# Patient Record
Sex: MALE | Race: WHITE | Employment: FULL TIME | URBAN - METROPOLITAN AREA
[De-identification: names, ages, dates, MRNs, and addresses within clinical notes are randomized per-mention and may not be internally consistent; named-entity substitution may affect disease eponyms.]

---

## 2017-01-17 ENCOUNTER — HOSPITAL ENCOUNTER (INPATIENT)
Age: 47
LOS: 3 days | Discharge: HOME | DRG: 286 | End: 2017-01-20
Attending: INTERNAL MEDICINE | Admitting: INTERNAL MEDICINE
Payer: COMMERCIAL

## 2017-01-17 ENCOUNTER — APPOINTMENT (OUTPATIENT)
Dept: GENERAL RADIOLOGY | Age: 47
DRG: 286 | End: 2017-01-17
Attending: PHYSICIAN ASSISTANT
Payer: COMMERCIAL

## 2017-01-17 DIAGNOSIS — R06.01 ORTHOPNEA: ICD-10-CM

## 2017-01-17 DIAGNOSIS — R29.818 SUSPECTED SLEEP APNEA: ICD-10-CM

## 2017-01-17 DIAGNOSIS — J90 PLEURAL EFFUSION: ICD-10-CM

## 2017-01-17 DIAGNOSIS — I50.23 ACUTE ON CHRONIC SYSTOLIC (CONGESTIVE) HEART FAILURE (HCC): ICD-10-CM

## 2017-01-17 DIAGNOSIS — I16.0 HYPERTENSIVE URGENCY: ICD-10-CM

## 2017-01-17 PROBLEM — R06.00 DYSPNEA: Status: ACTIVE | Noted: 2017-01-17

## 2017-01-17 LAB
ALBUMIN SERPL BCP-MCNC: 3.6 G/DL (ref 3.5–5)
ALBUMIN/GLOB SERPL: 1 {RATIO} (ref 1.2–3.5)
ALP SERPL-CCNC: 60 U/L (ref 50–136)
ALT SERPL-CCNC: 51 U/L (ref 12–65)
ANION GAP BLD CALC-SCNC: 10 MMOL/L (ref 7–16)
AST SERPL W P-5'-P-CCNC: 16 U/L (ref 15–37)
BILIRUB SERPL-MCNC: 0.7 MG/DL (ref 0.2–1.1)
BNP SERPL-MCNC: 511 PG/ML
BUN SERPL-MCNC: 21 MG/DL (ref 6–23)
CALCIUM SERPL-MCNC: 9 MG/DL (ref 8.3–10.4)
CHLORIDE SERPL-SCNC: 104 MMOL/L (ref 98–107)
CO2 SERPL-SCNC: 28 MMOL/L (ref 21–32)
CREAT SERPL-MCNC: 1.3 MG/DL (ref 0.8–1.5)
ERYTHROCYTE [DISTWIDTH] IN BLOOD BY AUTOMATED COUNT: 13.2 % (ref 11.9–14.6)
GLOBULIN SER CALC-MCNC: 3.7 G/DL (ref 2.3–3.5)
GLUCOSE SERPL-MCNC: 170 MG/DL (ref 65–100)
HCT VFR BLD AUTO: 41.5 % (ref 41.1–50.3)
HGB BLD-MCNC: 14.2 G/DL (ref 13.6–17.2)
MAGNESIUM SERPL-MCNC: 2 MG/DL (ref 1.8–2.4)
MCH RBC QN AUTO: 31.9 PG (ref 26.1–32.9)
MCHC RBC AUTO-ENTMCNC: 34.2 G/DL (ref 31.4–35)
MCV RBC AUTO: 93.3 FL (ref 79.6–97.8)
PLATELET # BLD AUTO: 248 K/UL (ref 150–450)
PMV BLD AUTO: 11.6 FL (ref 10.8–14.1)
POTASSIUM SERPL-SCNC: 3.7 MMOL/L (ref 3.5–5.1)
PROT SERPL-MCNC: 7.3 G/DL (ref 6.3–8.2)
RBC # BLD AUTO: 4.45 M/UL (ref 4.23–5.67)
SODIUM SERPL-SCNC: 142 MMOL/L (ref 136–145)
TROPONIN I SERPL-MCNC: 0.11 NG/ML (ref 0.02–0.05)
WBC # BLD AUTO: 14.6 K/UL (ref 4.3–11.1)

## 2017-01-17 PROCEDURE — 83880 ASSAY OF NATRIURETIC PEPTIDE: CPT | Performed by: PHYSICIAN ASSISTANT

## 2017-01-17 PROCEDURE — 99223 1ST HOSP IP/OBS HIGH 75: CPT | Performed by: INTERNAL MEDICINE

## 2017-01-17 PROCEDURE — 71020 XR CHEST PA LAT: CPT

## 2017-01-17 PROCEDURE — 74011250637 HC RX REV CODE- 250/637: Performed by: PHYSICIAN ASSISTANT

## 2017-01-17 PROCEDURE — 36415 COLL VENOUS BLD VENIPUNCTURE: CPT | Performed by: PHYSICIAN ASSISTANT

## 2017-01-17 PROCEDURE — 83735 ASSAY OF MAGNESIUM: CPT | Performed by: PHYSICIAN ASSISTANT

## 2017-01-17 PROCEDURE — 84484 ASSAY OF TROPONIN QUANT: CPT | Performed by: PHYSICIAN ASSISTANT

## 2017-01-17 PROCEDURE — 85027 COMPLETE CBC AUTOMATED: CPT | Performed by: PHYSICIAN ASSISTANT

## 2017-01-17 PROCEDURE — 80053 COMPREHEN METABOLIC PANEL: CPT | Performed by: PHYSICIAN ASSISTANT

## 2017-01-17 PROCEDURE — 65660000000 HC RM CCU STEPDOWN

## 2017-01-17 PROCEDURE — 74011250636 HC RX REV CODE- 250/636: Performed by: PHYSICIAN ASSISTANT

## 2017-01-17 PROCEDURE — 74011000258 HC RX REV CODE- 258: Performed by: PHYSICIAN ASSISTANT

## 2017-01-17 RX ORDER — ALBUTEROL SULFATE 90 UG/1
2 AEROSOL, METERED RESPIRATORY (INHALATION)
Status: DISCONTINUED | OUTPATIENT
Start: 2017-01-17 | End: 2017-01-20 | Stop reason: HOSPADM

## 2017-01-17 RX ORDER — CODEINE PHOSPHATE AND GUAIFENESIN 10; 100 MG/5ML; MG/5ML
5 SOLUTION ORAL
Status: DISCONTINUED | OUTPATIENT
Start: 2017-01-17 | End: 2017-01-20 | Stop reason: HOSPADM

## 2017-01-17 RX ORDER — SODIUM CHLORIDE 0.9 % (FLUSH) 0.9 %
5-10 SYRINGE (ML) INJECTION AS NEEDED
Status: DISCONTINUED | OUTPATIENT
Start: 2017-01-17 | End: 2017-01-20 | Stop reason: HOSPADM

## 2017-01-17 RX ORDER — CARVEDILOL 6.25 MG/1
6.25 TABLET ORAL 2 TIMES DAILY WITH MEALS
Status: DISCONTINUED | OUTPATIENT
Start: 2017-01-17 | End: 2017-01-19

## 2017-01-17 RX ORDER — ONDANSETRON 2 MG/ML
4 INJECTION INTRAMUSCULAR; INTRAVENOUS
Status: DISCONTINUED | OUTPATIENT
Start: 2017-01-17 | End: 2017-01-20 | Stop reason: HOSPADM

## 2017-01-17 RX ORDER — AMOXICILLIN AND CLAVULANATE POTASSIUM 875; 125 MG/1; MG/1
1 TABLET, FILM COATED ORAL EVERY 12 HOURS
Status: DISCONTINUED | OUTPATIENT
Start: 2017-01-17 | End: 2017-01-20 | Stop reason: HOSPADM

## 2017-01-17 RX ORDER — HYDROCODONE BITARTRATE AND ACETAMINOPHEN 7.5; 325 MG/1; MG/1
1 TABLET ORAL
Status: DISCONTINUED | OUTPATIENT
Start: 2017-01-17 | End: 2017-01-20 | Stop reason: HOSPADM

## 2017-01-17 RX ORDER — ACETAMINOPHEN 325 MG/1
650 TABLET ORAL
Status: DISCONTINUED | OUTPATIENT
Start: 2017-01-17 | End: 2017-01-20 | Stop reason: HOSPADM

## 2017-01-17 RX ORDER — LISINOPRIL 5 MG/1
2.5 TABLET ORAL DAILY
Status: DISCONTINUED | OUTPATIENT
Start: 2017-01-18 | End: 2017-01-19

## 2017-01-17 RX ORDER — NITROGLYCERIN 0.4 MG/1
0.4 TABLET SUBLINGUAL
Status: DISCONTINUED | OUTPATIENT
Start: 2017-01-17 | End: 2017-01-20 | Stop reason: HOSPADM

## 2017-01-17 RX ADMIN — NITROGLYCERIN 1 INCH: 20 OINTMENT TOPICAL at 19:34

## 2017-01-17 RX ADMIN — NITROGLYCERIN 1 INCH: 20 OINTMENT TOPICAL at 23:27

## 2017-01-17 RX ADMIN — FUROSEMIDE 10 MG/HR: 10 INJECTION, SOLUTION INTRAVENOUS at 19:35

## 2017-01-17 RX ADMIN — AMOXICILLIN AND CLAVULANATE POTASSIUM 1 TABLET: 875; 125 TABLET, FILM COATED ORAL at 21:36

## 2017-01-17 RX ADMIN — CARVEDILOL 6.25 MG: 6.25 TABLET, FILM COATED ORAL at 19:33

## 2017-01-17 NOTE — IP AVS SNAPSHOT
Current Discharge Medication List  
  
Take these medications at their scheduled times Dose & Instructions Dispensing Information Comments Morning Noon Evening Bedtime  
 carvedilol 12.5 mg tablet Commonly known as:  Jeison Grulla Your next dose is: Today Dose:  12.5 mg Take 1 Tab by mouth two (2) times daily (with meals). Quantity:  60 Tab Refills:  6  
     
  
   
   
  
   
  
 furosemide 40 mg tablet Commonly known as:  LASIX Your next dose is:  Tomorrow Dose:  40 mg Take 1 Tab by mouth daily. Quantity:  30 Tab Refills:  6  
     
  
   
   
   
  
 lisinopril 10 mg tablet Commonly known as:  Ashkan Bridges Your next dose is:  Tomorrow Dose:  10 mg Take 1 Tab by mouth daily. Quantity:  30 Tab Refills:  6  
     
  
   
   
   
  
 spironolactone 25 mg tablet Commonly known as:  ALDACTONE Your next dose is:  Tomorrow Dose:  25 mg Take 1 Tab by mouth daily. Quantity:  30 Tab Refills:  6 Take these medications as needed Dose & Instructions Dispensing Information Comments Morning Noon Evening Bedtime VENTOLIN HFA 90 mcg/actuation inhaler Generic drug:  albuterol Notes to Patient:  AS NEEDED Dose:  1-2 Puff Take 1-2 Puffs by inhalation every six (6) hours as needed for Wheezing. Refills:  0 Where to Get Your Medications Information about where to get these medications is not yet available ! Ask your nurse or doctor about these medications  
  carvedilol 12.5 mg tablet  
 furosemide 40 mg tablet  
 lisinopril 10 mg tablet  
 spironolactone 25 mg tablet

## 2017-01-17 NOTE — IP AVS SNAPSHOT
Steve Bailey 
 
 
 [address] 
[phone] Patient: Rosalind Soriano MRN: [id number] RHQ:[id number] You are allergic to the following No active allergies Recent Documentation Weight BMI Smoking Status 74.6 kg 26.33 kg/m2 Never Smoker Emergency Contacts Name Discharge Info Relation Home Work Mobile Danielle Webb  Spouse [3] [phone] About your hospitalization You were admitted on:  January 17, 2017 You last received care in the:  [facility] 3 TELEMETRY You were discharged on:  January 20, 2017 Unit phone number:  [phone] Why you were hospitalized Your primary diagnosis was:  Acute On Chronic Systolic (Congestive) Heart Failure (Hcc) Your diagnoses also included:  Hypertensive Urgency, Suspected Sleep Apnea, Dyspnea, Pleural Effusion Providers Seen During Your Hospitalizations Provider Role Specialty Primary office phone Shiv Gomez MD Attending Provider Cardiology [phone] Your Primary Care Physician (PCP) Primary Care Physician Office Phone Office Fax Corey Britt [phone] Follow-up Information Follow up With Details Comments Contact Info Paulie Delgado MD On 1/25/2017 Wednesday, Jan 25th @ 1:45pm-- [place] office [address]  [address] Cardiology [facility] [address] 
[phone] Jeffrey Whitehead MD   [id number] [place] [phone] Blood Work  Have blood work drawn in 1 week prior to follow-up appointment. Your Appointments Wednesday January 25, 2017  1:45 PM EST TRANSITIONAL CARE MANAGEMENT with Paulie Delgado MD  
[address] Cardiology ([address] [place] [address]) [address] 
[address]  
[phone] Current Discharge Medication List  
  
START taking these medications Dose & Instructions Dispensing Information Comments Morning Noon Evening Bedtime  
 carvedilol 12.5 mg tablet Commonly known as:  Lonnie George Your next dose is: Today Dose:  12.5 mg Take 1 Tab by mouth two (2) times daily (with meals). Quantity:  60 Tab Refills:  6  
     
  
   
   
  
   
  
 furosemide 40 mg tablet Commonly known as:  LASIX Your next dose is:  Tomorrow Dose:  40 mg Take 1 Tab by mouth daily. Quantity:  30 Tab Refills:  6  
     
  
   
   
   
  
 lisinopril 10 mg tablet Commonly known as:  Robertha Roup Your next dose is:  Tomorrow Dose:  10 mg Take 1 Tab by mouth daily. Quantity:  30 Tab Refills:  6  
     
  
   
   
   
  
 spironolactone 25 mg tablet Commonly known as:  ALDACTONE Your next dose is:  Tomorrow Dose:  25 mg Take 1 Tab by mouth daily. Quantity:  30 Tab Refills:  6 CONTINUE these medications which have NOT CHANGED Dose & Instructions Dispensing Information Comments Morning Noon Evening Bedtime VENTOLIN HFA 90 mcg/actuation inhaler Generic drug:  albuterol Notes to Patient:  AS NEEDED Dose:  1-2 Puff Take 1-2 Puffs by inhalation every six (6) hours as needed for Wheezing. Refills:  0 STOP taking these medications AUGMENTIN 875-125 mg per tablet Generic drug:  amoxicillin-clavulanate  
   
  
 chlorthalidone 25 mg tablet Commonly known as:  HYGROTEN  
   
  
 guaiFENesin-codeine 100-10 mg/5 mL solution Commonly known as:  ROBITUSSIN AC  
   
  
 nebivolol 10 mg tablet Commonly known as:  BYSTOLIC Where to Get Your Medications Information on where to get these meds will be given to you by the nurse or doctor. ! Ask your nurse or doctor about these medications  
  carvedilol 12.5 mg tablet  
 furosemide 40 mg tablet  
 lisinopril 10 mg tablet  
 spironolactone 25 mg tablet Discharge Instructions Cardiac Catheterization/Angiography Discharge Instructions *Check the puncture site frequently for swelling or bleeding. If you see any bleeding, lie down and apply pressure over the area with a clean town or washcloth. Notify your doctor for any redness, swelling, drainage or oozing from the puncture site. Notify your doctor for any fever or chills. *If the leg or arm with the puncture becomes cold, numb or painful, call [facility] Cardiology at [phone] *Activity should be limited for the next 48 hours. Climb stairs as little as possible and avoid any stooping, bending or strenuous activity for 48 hours. No heavy lifting (anything over 10 pounds) for three days. *Do not drive for 48 hours. *You may resume your usual diet. Drink more fluids than usual. 
 
*Have a responsible person drive you home and stay with you for at least 24 hours after your heart catheterization/angiography. *You may remove the bandage from your Left Wrist in 24 hours. You may shower in 24 hours. No tub baths, hot tubs or swimming for one week. Do not place any lotions, creams, powders, ointments over the puncture site for one week. You may place a clean band-aid over the puncture site each day for 5 days. Change this daily. Heart Failure: Care Instructions Your Care Instructions Heart failure occurs when your heart does not pump as much blood as the body needs. Failure does not mean that the heart has stopped pumping but rather that it is not pumping as well as it should. Over time, this causes fluid buildup in your lungs and other parts of your body. Fluid buildup can cause shortness of breath, fatigue, swollen ankles, and other problems. By taking medicines regularly, reducing sodium (salt) in your diet, checking your weight every day, and making lifestyle changes, you can feel better and live longer. Follow-up care is a key part of your treatment and safety. Be sure to make and go to all appointments, and call your doctor if you are having problems. It's also a good idea to know your test results and keep a list of the medicines you take. How can you care for yourself at home? Medicines · Be safe with medicines. Take your medicines exactly as prescribed. Call your doctor if you think you are having a problem with your medicine. · Do not take any vitamins, over-the-counter medicine, or herbal products without talking to your doctor first. Ceres Pleva not take ibuprofen (Advil or Motrin) and naproxen (Aleve) without talking to your doctor first. They could make your heart failure worse. · You may be taking some of the following medicine. ¨ Beta-blockers can slow heart rate, decrease blood pressure, and improve your condition. Taking a beta-blocker may lower your chance of needing to be hospitalized. ¨ Angiotensin-converting enzyme inhibitors (ACEIs) reduce the heart's workload, lower blood pressure, and reduce swelling. Taking an ACEI may lower your chance of needing to be hospitalized again. ¨ Angiotensin II receptor blockers (ARBs) work like ACEIs. Your doctor may prescribe them instead of ACEIs. ¨ Diuretics, also called water pills, reduce swelling. ¨ Potassium supplements replace this important mineral, which is sometimes lost with diuretics. ¨ Aspirin and other blood thinners prevent blood clots, which can cause a stroke or heart attack. You will get more details on the specific medicines your doctor prescribes. Diet · Your doctor may suggest that you limit sodium to 2,000 milligrams (mg) a day or less. That is less than 1 teaspoon of salt a day, including all the salt you eat in cooking or in packaged foods. People get most of their sodium from processed foods. Fast food and restaurant meals also tend to be very high in sodium. · Ask your doctor how much liquid you can drink each day.  You may have to limit liquids. Weight · Weigh yourself without clothing at the same time each day. Record your weight. Call your doctor if you gain more than 3 pounds in 2 to 3 days. A sudden weight gain may mean that your heart failure is getting worse. Activity level · Start light exercise (if your doctor says it is okay). Even if you can only do a small amount, exercise will help you get stronger, have more energy, and manage your weight and your stress. Walking is an easy way to get exercise. Start out by walking a little more than you did before. Bit by bit, increase the amount you walk. · When you exercise, watch for signs that your heart is working too hard. You are pushing yourself too hard if you cannot talk while you are exercising. If you become short of breath or dizzy or have chest pain, stop, sit down, and rest. 
· If you feel \"wiped out\" the day after you exercise, walk slower or for a shorter distance until you can work up to a better pace. · Get enough rest at night. Sleeping with 1 or 2 pillows under your upper body and head may help you breathe easier. Lifestyle changes · Do not smoke. Smoking can make a heart condition worse. If you need help quitting, talk to your doctor about stop-smoking programs and medicines. These can increase your chances of quitting for good. Quitting smoking may be the most important step you can take to protect your heart. · Limit alcohol to 2 drinks a day for men and 1 drink a day for women. Too much alcohol can cause health problems. · Avoid getting sick from colds and the flu. Get a pneumococcal vaccine shot. If you have had one before, ask your doctor whether you need another dose. Get a flu shot each year. If you must be around people with colds or the flu, wash your hands often. When should you call for help? Call 911 if you have symptoms of sudden heart failure such as: 
· You have severe trouble breathing. · You cough up pink, foamy mucus. · You have a new irregular or rapid heartbeat. Call your doctor now or seek immediate medical care if: 
· You have new or increased shortness of breath. · You are dizzy or lightheaded, or you feel like you may faint. · You have sudden weight gain, such as 3 pounds or more in 2 to 3 days. · You have increased swelling in your legs, ankles, or feet. · You are suddenly so tired or weak that you cannot do your usual activities. Watch closely for changes in your health, and be sure to contact your doctor if: 
· You develop new symptoms. Where can you learn more? Go to http://edith-jose luis.info/. Enter J789 in the search box to learn more about \"Heart Failure: Care Instructions. \" Current as of: January 27, 2016 Content Version: 11.1 © 3704-3985 [employer]. Care instructions adapted under license by [employer] (which disclaims liability or warranty for this information). If you have questions about a medical condition or this instruction, always ask your healthcare professional. [place] [address] any warranty or liability for your use of this information. Limiting Sodium and Fluids With Heart Failure: Care Instructions Your Care Instructions Sodium causes your body to keep extra water, making it harder for your heart to pump. By limiting sodium, you will feel better and lower your risk of having to go to the hospital. 
People get most of their sodium from processed foods. Fast food and restaurant meals also tend to be very high in sodium. Your doctor may suggest that you limit sodium to 2,000 milligrams (mg) a day or less. That is less than 1 teaspoon of salt a day, including all the salt you eat in cooked or packaged foods. Usually, you have to limit the amount of liquids you drink only if your heart failure is severe. Limiting sodium alone often is enough to help your body get rid of extra fluids.  However, your doctor may tell you to limit your fluid intake to a set amount each day. Follow-up care is a key part of your treatment and safety. Be sure to make and go to all appointments, and call your doctor if you are having problems. It's also a good idea to know your test results and keep a list of the medicines you take. How can you care for yourself at home? Read food labels · Read food labels on cans and food packages. The labels tell you how much sodium is in each serving. Make sure that you look at the serving size. If you eat more than the serving size, you have eaten more sodium than is listed for one serving. · Food labels also tell you the Percent Daily Value. If the Percent Daily Value says 50%, it means that you will get at least 50% of all the sodium you need for the entire day in one serving. Choose products with low Percent Daily Values for sodium. · Be aware that sodium can come in forms other than salt, including monosodium glutamate (MSG), sodium citrate, and sodium bicarbonate (baking soda). MSG is often added to Asian food. You can sometimes ask for food without MSG or salt. Buy low-sodium foods · Buy foods that are labeled \"unsalted\" (no salt added), \"sodium-free\" (less than 5 mg of sodium per serving), or \"low-sodium\" (less than 140 mg of sodium per serving). A food labeled \"light sodium\" has less than half of the full-sodium version of that food. Foods labeled \"reduced-sodium\" may still have too much sodium. · Buy fresh vegetables or plain, frozen vegetables. Buy low-sodium versions of canned vegetables, soups, and other canned goods. Prepare low-sodium meals · Use less salt each day when cooking. Reducing salt in this way will help you adjust to the taste. Do not add salt after cooking. Take the salt shaker off the table. · Flavor your food with garlic, lemon juice, onion, vinegar, herbs, and spices instead of salt. Do not use soy sauce, steak sauce, onion salt, garlic salt, mustard, or ketchup on your food. · Make your own salad dressings, sauces, and ketchup without adding salt. · Use less salt (or none) when recipes call for it. You can often use half the salt a recipe calls for without losing flavor. Other dishes like rice, pasta, and grains do not need added salt. · Rinse canned vegetables. This removes somebut not allof the salt. · Avoid water that has a naturally high sodium content or that has been treated with water softeners, which add sodium. Call your local water company to find out the sodium content of your water supply. If you buy bottled water, read the label and choose a sodium-free brand. Avoid high-sodium foods, such as: 
· Smoked, cured, salted, and canned meat, fish, and poultry. · Ham, patel, hot dogs, and luncheon meats. · Regular, hard, and processed cheese and regular peanut butter. · Crackers with salted tops. · Frozen prepared meals. · Canned and dried soups, broths, and bouillon, unless labeled sodium-free or low-sodium. · Canned vegetables, unless labeled sodium-free or low-sodium. · Salted snack foods such as chips and pretzels. · Western Tiffanie fries, pizza, tacos, and other fast foods. · Pickles, olives, ketchup, and other condiments, especially soy sauce, unless labeled sodium-free or low-sodium. If you cannot cook for yourself · Have family members or friends help you, or have someone cook low-sodium meals. · Check with your local senior nutrition program to find out where meals are served and whether they offer a low-sodium option. You can often find these programs through your local health department or hospital. 
· Have meals delivered to your home. Most [facility] have a Meals on SharePlow. These programs provide one hot meal a day for older adults, delivered to their homes. Ask whether these meals are low-sodium. Let them know that you are on a low-sodium diet. Limiting fluid intake · Find a method that works for you.  You might simply write down how much you drink every time you do. Some people keep a container filled with the amount of fluid allowed for that day. If they drink from a source other than the container, then they pour out that amount. · Measure your regular drinking glasses to find out how much fluid each one holds. Once you know this, you will not have to measure every time. · Besides water, milk, juices, and other drinks, some foods have a lot of fluid. Count any foods that will melt (such as ice cream or gelatin dessert) or liquid foods (such as soup) as part of your fluid intake for the day. Where can you learn more? Go to http://edith-jose luis.info/. Enter A166 in the search box to learn more about \"Limiting Sodium and Fluids With Heart Failure: Care Instructions. \" Current as of: January 27, 2016 Content Version: 11.1 © 9228-1242 [employer]. Care instructions adapted under license by [employer] (which disclaims liability or warranty for this information). If you have questions about a medical condition or this instruction, always ask your healthcare professional. [place] [address] any warranty or liability for your use of this information. Avoiding Triggers With Heart Failure: Care Instructions Your Care Instructions Triggers are anything that make your heart failure flare up. A flare-up is also called \"sudden heart failure\" or \"acute heart failure. \" When you have a flare-up, fluid builds up in your lungs, and you have problems breathing. You might need to go to the hospital. By watching for changes in your condition and avoiding triggers, you can prevent heart failure flare-ups. Follow-up care is a key part of your treatment and safety. Be sure to make and go to all appointments, and call your doctor if you are having problems. It's also a good idea to know your test results and keep a list of the medicines you take. How can you care for yourself at home? Watch for changes in your weight and condition · Weigh yourself without clothing at the same time each day. Record your weight. Call your doctor if you gain 3 pounds or more in 2 to 3 days. A sudden weight gain may mean that your heart failure is getting worse. · Keep a daily record of your symptoms. Write down any changes in how you feel, such as new shortness of breath, cough, or problems eating. Also record if your ankles are more swollen than usual and if you have to urinate in the night more often. Note anything that you ate or did that could have triggered these changes. Limit sodium Sodium causes your body to hold on to water, making it harder for your heart to pump. People get most of their sodium from processed foods. Fast food and restaurant meals also tend to be very high in sodium. · Your doctor may suggest that you limit sodium to 2,000 milligrams (mg) a day or less. That is less than 1 teaspoon of salt a day, including all the salt you eat in cooking or in packaged foods. · Read food labels on cans and food packages. They tell you how much sodium you get in one serving. Check the serving size. If you eat more than one serving, you are getting more sodium. · Be aware that sodium can come in forms other than salt, including monosodium glutamate (MSG), sodium citrate, and sodium bicarbonate (baking soda). MSG is often added to Asian food. You can sometimes ask for food without MSG or salt. · Slowly reducing salt will help you adjust to the taste. Take the salt shaker off the table. · Flavor your food with garlic, lemon juice, onion, vinegar, herbs, and spices instead of salt. Do not use soy sauce, steak sauce, onion salt, garlic salt, mustard, or ketchup on your food, unless it is labeled \"low-sodium\" or \"low-salt. \" 
· Make your own salad dressings, sauces, and ketchup without adding salt.  
· Use fresh or frozen ingredients, instead of canned ones, whenever you can. Choose low-sodium canned goods. · Eat less processed food and food from restaurants, including fast food. Exercise as directed Moderate, regular exercise is very good for your heart. It improves your blood flow and helps control your weight. But too much exercise can stress your heart and cause a heart failure flare-up. · Check with your doctor before you start an exercise program. 
· Walking is an easy way to get exercise. Start out slowly. Gradually increase the length and pace of your walk. Swimming, riding a bike, and using a treadmill are also good forms of exercise. · When you exercise, watch for signs that your heart is working too hard. You are pushing yourself too hard if you cannot talk while you are exercising. If you become short of breath or dizzy or have chest pain, stop, sit down, and rest. 
· Do not exercise when you do not feel well. Take medicines correctly · Take your medicines exactly as prescribed. Call your doctor if you think you are having a problem with your medicine. · Make a list of all the medicines you take. Include those prescribed to you by other doctors and any over-the-counter medicines, vitamins, or supplements you take. Take this list with you when you go to any doctor. · Take your medicines at the same time every day. It may help you to post a list of all the medicines you take every day and what time of day you take them. · Make taking your medicine as simple as you can. Plan times to take your medicines when you are doing other things, such as eating a meal or getting ready for bed. This will make it easier to remember to take your medicines. · Get organized. Use helpful tools, such as daily or weekly pill containers. When should you call for help? Call 911 if you have symptoms of sudden heart failure such as: 
· You have severe trouble breathing. · You cough up pink, foamy mucus. · You have a new irregular or rapid heartbeat. Call your doctor now or seek immediate medical care if: 
· You have new or increased shortness of breath. · You are dizzy or lightheaded, or you feel like you may faint. · You have sudden weight gain, such as 3 pounds or more in 2 to 3 days. · You have increased swelling in your legs, ankles, or feet. · You are suddenly so tired or weak that you cannot do your usual activities. Watch closely for changes in your health, and be sure to contact your doctor if you develop new symptoms. Where can you learn more? Go to http://edith-jose luis.info/. Enter Z718 in the search box to learn more about \"Avoiding Triggers With Heart Failure: Care Instructions. \" Current as of: April 27, 2016 Content Version: 11.1 © 4273-2558 [employer]. Care instructions adapted under license by [employer] (which disclaims liability or warranty for this information). If you have questions about a medical condition or this instruction, always ask your healthcare professional. [place] [address] any warranty or liability for your use of this information. DISCHARGE SUMMARY from Nurse The following personal items are in your possession at time of discharge: 
 
Dental Appliances: Lowers, Partials, With patient (Plate) Visual Aid: Glasses Jewelry: Ring, With patient Clothing: Pants, Shirt, Undergarments, With patient PATIENT INSTRUCTIONS: 
 
 
F-face looks uneven A-arms unable to move or move unevenly S-speech slurred or non-existent T-time-call 911 as soon as signs and symptoms begin-DO NOT go Back to bed or wait to see if you get better-TIME IS BRAIN. Warning Signs of HEART ATTACK Call 911 if you have these symptoms: 
? Chest discomfort. Most heart attacks involve discomfort in the center of the chest that lasts more than a few minutes, or that goes away and comes back. It can feel like uncomfortable pressure, squeezing, fullness, or pain. ? Discomfort in other areas of the upper body. Symptoms can include pain or discomfort in one or both arms, the back, neck, jaw, or stomach. ? Shortness of breath with or without chest discomfort. ? Other signs may include breaking out in a cold sweat, nausea, or lightheadedness. Don't wait more than five minutes to call [address]! Fast action can save your life. Calling 911 is almost always the fastest way to get lifesaving treatment. Emergency Medical Services staff can begin treatment when they arrive  up to an hour sooner than if someone gets to the hospital by car. The discharge information has been reviewed with the patient. The patient verbalized understanding. Discharge medications reviewed with the patient and appropriate educational materials and side effects teaching were provided. Discharge Orders Procedure Order Date Status Priority Quantity Spec Type Associated Dx METABOLIC PANEL, BASIC 55/63/32 1410 Future Routine 1 Blood fuseSPORT Announcement We are excited to announce that we are making your provider's discharge notes available to you in fuseSPORT. You will see these notes when they are completed and signed by the physician that discharged you from your recent hospital stay. If you have any questions or concerns about any information you see in fuseSPORT, please call the Health Information Department where you were seen or reach out to your Primary Care Provider for more information about your plan of care. Introducing [facility]!    
 Dianne Cortes introduces [employer]SPORT patient portal. Now you can access parts of your medical record, email your doctor's office, and request medication refills online. 1. In your internet browser, go to [email or web address] 2. Click on the First Time User? Click Here link in the Sign In box. You will see the New Member Sign Up page. 3. Enter your Memobox Access Code exactly as it appears below. You will not need to use this code after youve completed the sign-up process. If you do not sign up before the expiration date, you must request a new code. · Memobox Access Code: [email or web address] Expires: 4/17/2017  2:24 PM 
 
4. Enter the last four digits of your Social Security Number (xxxx) and Date of Birth (mm/dd/yyyy) as indicated and click Submit. You will be taken to the next sign-up page. 5. Create a Memobox ID. This will be your Memobox login ID and cannot be changed, so think of one that is secure and easy to remember. 6. Create a Memobox password. You can change your password at any time. 7. Enter your Password Reset Question and Answer. This can be used at a later time if you forget your password. 8. Enter your e-mail address. You will receive e-mail notification when new information is available in [address] 9. Click Sign Up. You can now view and download portions of your medical record. 10. Click the Download Summary menu link to download a portable copy of your medical information. If you have questions, please visit the Frequently Asked Questions section of the Memobox website. Remember, Memobox is NOT to be used for urgent needs. For medical emergencies, dial 911. Now available from your iPhone and Android! General Information Please provide this summary of care documentation to your next provider. Patient Signature:  ____________________________________________________________ Date:  ____________________________________________________________  
  
Layla Feller  Provider Signature: ____________________________________________________________ Date:  ____________________________________________________________

## 2017-01-18 LAB
ANION GAP BLD CALC-SCNC: 10 MMOL/L (ref 7–16)
BUN SERPL-MCNC: 27 MG/DL (ref 6–23)
CALCIUM SERPL-MCNC: 9.1 MG/DL (ref 8.3–10.4)
CHLORIDE SERPL-SCNC: 102 MMOL/L (ref 98–107)
CHOLEST SERPL-MCNC: 180 MG/DL
CO2 SERPL-SCNC: 29 MMOL/L (ref 21–32)
CREAT SERPL-MCNC: 1.45 MG/DL (ref 0.8–1.5)
ERYTHROCYTE [DISTWIDTH] IN BLOOD BY AUTOMATED COUNT: 13.4 % (ref 11.9–14.6)
GLUCOSE SERPL-MCNC: 149 MG/DL (ref 65–100)
HCT VFR BLD AUTO: 41 % (ref 41.1–50.3)
HDLC SERPL-MCNC: 45 MG/DL (ref 40–60)
HDLC SERPL: 4 {RATIO}
HGB BLD-MCNC: 13.8 G/DL (ref 13.6–17.2)
LDLC SERPL CALC-MCNC: 121.4 MG/DL
LIPID PROFILE,FLP: ABNORMAL
MAGNESIUM SERPL-MCNC: 2.2 MG/DL (ref 1.8–2.4)
MCH RBC QN AUTO: 31.7 PG (ref 26.1–32.9)
MCHC RBC AUTO-ENTMCNC: 33.7 G/DL (ref 31.4–35)
MCV RBC AUTO: 94 FL (ref 79.6–97.8)
PLATELET # BLD AUTO: 255 K/UL (ref 150–450)
PMV BLD AUTO: 12 FL (ref 10.8–14.1)
POTASSIUM SERPL-SCNC: 3.7 MMOL/L (ref 3.5–5.1)
RBC # BLD AUTO: 4.36 M/UL (ref 4.23–5.67)
SODIUM SERPL-SCNC: 141 MMOL/L (ref 136–145)
TRIGL SERPL-MCNC: 68 MG/DL (ref 35–150)
TROPONIN I SERPL-MCNC: 0.1 NG/ML (ref 0.02–0.05)
TROPONIN I SERPL-MCNC: 0.11 NG/ML (ref 0.02–0.05)
TROPONIN I SERPL-MCNC: 0.11 NG/ML (ref 0.02–0.05)
TROPONIN I SERPL-MCNC: 0.13 NG/ML (ref 0.02–0.05)
VLDLC SERPL CALC-MCNC: 13.6 MG/DL (ref 6–23)
WBC # BLD AUTO: 13.4 K/UL (ref 4.3–11.1)

## 2017-01-18 PROCEDURE — 97530 THERAPEUTIC ACTIVITIES: CPT

## 2017-01-18 PROCEDURE — 84484 ASSAY OF TROPONIN QUANT: CPT | Performed by: PHYSICIAN ASSISTANT

## 2017-01-18 PROCEDURE — 74011250637 HC RX REV CODE- 250/637: Performed by: NURSE PRACTITIONER

## 2017-01-18 PROCEDURE — 99232 SBSQ HOSP IP/OBS MODERATE 35: CPT | Performed by: INTERNAL MEDICINE

## 2017-01-18 PROCEDURE — 97161 PT EVAL LOW COMPLEX 20 MIN: CPT

## 2017-01-18 PROCEDURE — 83735 ASSAY OF MAGNESIUM: CPT | Performed by: PHYSICIAN ASSISTANT

## 2017-01-18 PROCEDURE — 80061 LIPID PANEL: CPT | Performed by: PHYSICIAN ASSISTANT

## 2017-01-18 PROCEDURE — 36415 COLL VENOUS BLD VENIPUNCTURE: CPT | Performed by: PHYSICIAN ASSISTANT

## 2017-01-18 PROCEDURE — 80048 BASIC METABOLIC PNL TOTAL CA: CPT | Performed by: PHYSICIAN ASSISTANT

## 2017-01-18 PROCEDURE — 74011250637 HC RX REV CODE- 250/637: Performed by: PHYSICIAN ASSISTANT

## 2017-01-18 PROCEDURE — 65660000000 HC RM CCU STEPDOWN

## 2017-01-18 PROCEDURE — 74011250636 HC RX REV CODE- 250/636: Performed by: PHYSICIAN ASSISTANT

## 2017-01-18 PROCEDURE — 74011000258 HC RX REV CODE- 258: Performed by: PHYSICIAN ASSISTANT

## 2017-01-18 PROCEDURE — 85027 COMPLETE CBC AUTOMATED: CPT | Performed by: INTERNAL MEDICINE

## 2017-01-18 RX ORDER — ATORVASTATIN CALCIUM 40 MG/1
80 TABLET, FILM COATED ORAL
Status: DISCONTINUED | OUTPATIENT
Start: 2017-01-18 | End: 2017-01-20 | Stop reason: HOSPADM

## 2017-01-18 RX ORDER — GUAIFENESIN 100 MG/5ML
81 LIQUID (ML) ORAL DAILY
Status: DISCONTINUED | OUTPATIENT
Start: 2017-01-18 | End: 2017-01-20 | Stop reason: HOSPADM

## 2017-01-18 RX ORDER — SODIUM CHLORIDE 9 MG/ML
75 INJECTION, SOLUTION INTRAVENOUS CONTINUOUS
Status: DISCONTINUED | OUTPATIENT
Start: 2017-01-19 | End: 2017-01-19

## 2017-01-18 RX ORDER — CODEINE PHOSPHATE AND GUAIFENESIN 10; 100 MG/5ML; MG/5ML
5 SOLUTION ORAL
COMMUNITY
End: 2017-01-20

## 2017-01-18 RX ORDER — ALBUTEROL SULFATE 90 UG/1
1-2 AEROSOL, METERED RESPIRATORY (INHALATION)
COMMUNITY
End: 2017-02-27

## 2017-01-18 RX ORDER — AMOXICILLIN AND CLAVULANATE POTASSIUM 875; 125 MG/1; MG/1
1 TABLET, FILM COATED ORAL 2 TIMES DAILY
COMMUNITY
End: 2017-01-20

## 2017-01-18 RX ADMIN — LISINOPRIL: 5 TABLET ORAL at 10:18

## 2017-01-18 RX ADMIN — ASPIRIN 81 MG CHEWABLE TABLET 81 MG: 81 TABLET CHEWABLE at 10:18

## 2017-01-18 RX ADMIN — CARVEDILOL 6.25 MG: 6.25 TABLET, FILM COATED ORAL at 10:19

## 2017-01-18 RX ADMIN — FUROSEMIDE 10 MG/HR: 10 INJECTION, SOLUTION INTRAVENOUS at 07:10

## 2017-01-18 RX ADMIN — NITROGLYCERIN 1 INCH: 20 OINTMENT TOPICAL at 06:20

## 2017-01-18 RX ADMIN — NITROGLYCERIN 1 INCH: 20 OINTMENT TOPICAL at 18:00

## 2017-01-18 RX ADMIN — AMOXICILLIN AND CLAVULANATE POTASSIUM 1 TABLET: 875; 125 TABLET, FILM COATED ORAL at 22:37

## 2017-01-18 RX ADMIN — AMOXICILLIN AND CLAVULANATE POTASSIUM 1 TABLET: 875; 125 TABLET, FILM COATED ORAL at 10:22

## 2017-01-18 RX ADMIN — CARVEDILOL 6.25 MG: 6.25 TABLET, FILM COATED ORAL at 17:58

## 2017-01-18 RX ADMIN — ATORVASTATIN CALCIUM 80 MG: 40 TABLET, FILM COATED ORAL at 22:37

## 2017-01-18 NOTE — CONSULTS
CONSULT NOTE    Gordon Mayers    1/17/2017    Date of Admission:  1/17/2017    The patient's chart is reviewed and the patient is discussed with the staff. Subjective: The patient is a 40 y.o.  male seen and evaluated at the request of Dr. Chip Groves due to pleural effusions. The pt has a hx of HTN (non compliant with Rx) and probable MISSY (with snoring and witnessed apneas per wife) who is admitted for management of acute systolic heart failure and uncontrolled HTN. He has noted increased dyspnea over the past 2-3 weeks with increased orthopnea over the past week. He was seen in an urgent care center several days ago and was diagnosed with bronchitis and treated with oral antibiotics. He did not improve and presented to the of his PCP, Dr. Mark Atkinson who obtained an EKG. He was referred to cardiology for an echo which revealed an LVEF of 35% today with a pleural effusion for which he is now admitted. The pt reports a cough with scant blood tinged sputum a couple of days ago but no F/C/S. His wife has noticed dyspnea is worse while supine and some trace LE edema. He has not had any chest pain. He was diagnosed with HTN years ago but has not been compliant with treatment. He drinks considerable Coke daily and eats a lot of salty food. He is not a smoker. He drinks considerable sweet tea but does not think he has been gaining weight or having EDS. His BP was noted to be 212/116 at Dr. López Rod office. He is admitted for further management.        Review of Systems    Denies: fevers, chills, sweats, fatigue, malaise, anorexia, weight loss   Denies: blurry vision, loss of vision, eye pain, photophobia  Denies: hearing loss, ringing in the ears, earache, epistaxis  Denies: chest pain, palpitations, syncope, orthopnea, paroxysmal nocturnal dyspnea, claudication  Denies: dysphagia, odynophagia, nausea, vomiting, diarrhea, constipation, abdominal pain, jaundice, melena   Denies: frequency, dysuria, nocturia, urinary incontinence, stones, hematuria  Denies: polydipsia/polyuria, skin changes, temperature intolerance, unexpected weight gain  Denies: back pain, joint pain, joint swelling, muscle pain, muscle weakness  Denies: bleeding problems, blood transfusions, bruising, pallor, swollen lymph nodes  Denies: headache, dysarthria, blurred vision, diplopia,seizure, focal deficits. Admits to: dyspnea, orthopnea, snoring, witnessed apneas. Patient Active Problem List   Diagnosis Code    Acute on chronic systolic (congestive) heart failure (HCC) I50.23    Hypertensive urgency I16.0    Suspected sleep apnea G47.30    Dyspnea R06.00    Pleural effusion J90         Prior to Admission Medications   Prescriptions Last Dose Informant Patient Reported? Taking? GUAIFENESIN AC  mg/5 mL solution   Yes No   Sig: take 1 teaspoonful by mouth three times a day if needed for 5 days   VENTOLIN HFA 90 mcg/actuation inhaler   Yes No   Sig: inhale 1 to 2 puffs by mouth every 6 hours if needed for cough   amoxicillin-clavulanate (AUGMENTIN) 875-125 mg per tablet   Yes No   Sig: take 1 tablet by mouth twice a day for 10 days   chlorthalidone (HYGROTEN) 25 mg tablet   No No   Sig: Take 1 Tab by mouth daily for 30 days. nebivolol (BYSTOLIC) 10 mg tablet   No No   Sig: Take 1 Tab by mouth daily for 21 days. Facility-Administered Medications: None       No past medical history on file. Past Surgical History   Procedure Laterality Date    Hx cholecystectomy       Social History     Social History    Marital status:      Spouse name: N/A    Number of children: N/A    Years of education: N/A     Occupational History    Not on file.      Social History Main Topics    Smoking status: Never Smoker    Smokeless tobacco: Not on file    Alcohol use Not on file    Drug use: Not on file    Sexual activity: Not on file     Other Topics Concern    Not on file     Social History Narrative  No narrative on file     Family History   Problem Relation Age of Onset    Heart Attack Father     Heart Disease Father     Hypertension Father     Diabetes Sister     Hypertension Paternal Grandmother     Hypertension Paternal Grandfather     Cancer Neg Hx      No Known Allergies    Current Facility-Administered Medications   Medication Dose Route Frequency    amoxicillin-clavulanate (AUGMENTIN) 875-125 mg per tablet 1 Tab  1 Tab Oral Q12H    guaiFENesin-codeine (ROBITUSSIN AC) 100-10 mg/5 mL solution 5 mL  5 mL Oral Q4H PRN    albuterol (PROVENTIL HFA, VENTOLIN HFA, PROAIR HFA) inhaler 2 Puff  2 Puff Inhalation Q4H PRN    sodium chloride (NS) flush 5-10 mL  5-10 mL IntraVENous PRN    nitroglycerin (NITROSTAT) tablet 0.4 mg  0.4 mg SubLINGual Q5MIN PRN    acetaminophen (TYLENOL) tablet 650 mg  650 mg Oral Q4H PRN    HYDROcodone-acetaminophen (NORCO) 7.5-325 mg per tablet 1 Tab  1 Tab Oral Q4H PRN    furosemide (LASIX) 100 mg in 0.9% sodium chloride 100 mL infusion  10 mg/hr IntraVENous CONTINUOUS    carvedilol (COREG) tablet 6.25 mg  6.25 mg Oral BID WITH MEALS    [START ON 1/18/2017] lisinopril (PRINIVIL, ZESTRIL) tablet 2.5 mg  2.5 mg Oral DAILY    ondansetron (ZOFRAN) injection 4 mg  4 mg IntraVENous Q4H PRN    nitroglycerin (NITROBID) 2 % ointment 1 Inch  1 Inch Topical Q6H         Objective:     Vitals:    01/17/17 1825   BP: (!) 184/115   Pulse: 95   Temp: 97.4 °F (36.3 °C)   SpO2: 92%       PHYSICAL EXAM     Constitutional:  the patient is well developed and in no mild distress  EENMT:  Sclera clear, pupils equal, oral mucosa moist; the OP is narrow  Neck: no definite JVD at 90* or retractions  Respiratory: decreased BS at the bases with dullness  Cardiovascular:  RRR without M,G,R  Gastrointestinal: soft and non-tender; with positive bowel sounds. Musculoskeletal: warm without cyanosis. There is trace pedal edema.   Skin:  no jaundice or rashes  Neurologic: no gross neuro deficits Psychiatric:  alert and oriented x 3    Chest X-ray:                No results for input(s): WBC, HGB, HCT, PLT, INR, HGBEXT, HCTEXT, PLTEXT, HGBEXT, HCTEXT, PLTEXT in the last 72 hours. No lab exists for component: INREXT, INREXT  No results for input(s): NA, K, CL, GLU, CO2, BUN, CREA, MG, PHOS, CA, TROIQ, ALB, TBIL, TBILI, GPT, ALT, SGOT, BNPP in the last 72 hours. No lab exists for component: TROIP  No results for input(s): PH, PCO2, PO2, HCO3 in the last 72 hours. No results for input(s): LCAD, LAC in the last 72 hours. Assessment:  (Medical Decision Making)     Hospital Problems  Date Reviewed: 1/17/2017          Codes Class Noted POA    * (Principal)Acute on chronic systolic (congestive) heart failure ([facility] [address] ICD-10-CM: I50.23  ICD-9-CM: 428.23, 428.0  1/17/2017 Yes    Probably related to uncontrolled HTN. No history to suggest coronary ischemia. Hypertensive urgency ICD-10-CM: I16.0  ICD-9-CM: 401.9  1/17/2017 Yes    Needs aggressive BP control and diuresis    Suspected sleep apnea ICD-10-CM: G47.30  ICD-9-CM: 780.57  1/17/2017 Yes    Needs sleep eval after hospitalization. Consider empiric Rx with APAP but suspect he won't sleep much tonight on lasix drip. Dyspnea ICD-10-CM: R06.00  ICD-9-CM: 786.09  1/17/2017 Yes    Due to heart failure. Pleural effusion ICD-10-CM: J90  ICD-9-CM: 511.9  1/17/2017 Yes    Not really evident on CXR but noted on echo. Plan:  (Medical Decision Making)     BP control. Diuresis. Follow admission labs and follow up labs. Consider empiric APAP after diuresis tonight. PSG as outpt. Check TSH. Dietary modification needed. --    More than 50% of the time documented was spent in face-to-face contact with the patient and in the care of the patient on the floor/unit where the patient is located. Thank you very much for this referral.  We appreciate the opportunity to participate in this patient's care.   Will follow along with above stated Dewayne Jolly MD

## 2017-01-18 NOTE — PROGRESS NOTES
Bedside shift change report given to [address] [place] [address]. Report included the following information SBAR, Kardex, MAR and Recent Results.

## 2017-01-18 NOTE — PROGRESS NOTES
[facility] CARDIOLOGY PROGRESS NOTE           1/18/2017 8:05 AM    Admit Date: 1/17/2017      Subjective:   Breathing improved. HTN improved overnight. Able to lye flat overnight. Noted troponin levels, denies cp, nonsmoker, + family hx --brothers and father with CAD. ROS:  Cardiovascular:  As noted above    Objective:      Vitals:    01/17/17 2118 01/18/17 0037 01/18/17 0601 01/18/17 0802   BP: (!) 154/101 137/72 127/85 (!) 129/92   Pulse: 79 66 64 66   Resp: 18 18 18 16   Temp: 98.1 °F (36.7 °C) 98 °F (36.7 °C) 97.6 °F (36.4 °C) 97.7 °F (36.5 °C)   SpO2: 94% 93% 90% 94%   Weight:   76.8 kg (169 lb 6.4 oz)        Physical Exam:  General-No Acute Distress  Neck- supple, no JVD  CV- regular rate and rhythm no MRG  Lung- clear bilaterally  Abd- soft, nontender, nondistended  Ext- no edema bilaterally. Skin- warm and dry    Data Review:   Recent Labs      01/18/17   0515  01/18/17   0512  01/18/17   0041  01/17/17 2010   NA   --   141   --   142   K   --   3.7   --   3.7   MG   --   2.2   --   2.0   BUN   --   27*   --   21   CREA   --   1.45   --   1.30   GLU   --   149*   --   170*   WBC   --    --    --   14.6*   HGB   --    --    --   14.2   HCT   --    --    --   41.5   PLT   --    --    --   248   TROIQ  0.11*   --   0.11*  0.11*   CHOL   --   180   --    --    TGL   --   68   --    --    LDLC   --   121.4*   --    --    HDL   --   45   --    --        Assessment/Plan:     Principal Problem:    Acute on chronic systolic (congestive) heart failure (HCC) (1/17/2017)--reported EF 35 % on echo in office. Noted troponin levels, + fam hx, htn, dyslip--needs cath eventually. Will add low dose asa and statin. Will stop lasix gtt with rise in crt.      Active Problems:    Hypertensive urgency (1/17/2017)--improved, tolerating coreg and lisinopril      Suspected sleep apnea (1/17/2017)      Dyspnea (1/17/2017)      Pleural effusion (1/17/2017)--pulmonary following          Costella Brian, NP  1/18/2017 8:05 AM

## 2017-01-18 NOTE — PROGRESS NOTES
Bedside and Verbal shift change report given to Mark French RN (oncoming nurse) by Davide Frederick RN (offgoing nurse). Report included the following information SBAR, Kardex, Accordion and Recent Results.

## 2017-01-18 NOTE — PROGRESS NOTES
Roger Gannon  Admission Date: 1/17/2017             Daily Progress Note: 1/18/2017    The patient's chart is reviewed and the patient is discussed with the staff.     Subjective:     Feels better  On RA  For heat cath in AM    Current Facility-Administered Medications   Medication Dose Route Frequency    aspirin chewable tablet 81 mg  81 mg Oral DAILY    atorvastatin (LIPITOR) tablet 80 mg  80 mg Oral QHS    [START ON 1/19/2017] 0.9% sodium chloride infusion  75 mL/hr IntraVENous CONTINUOUS    amoxicillin-clavulanate (AUGMENTIN) 875-125 mg per tablet 1 Tab  1 Tab Oral Q12H    guaiFENesin-codeine (ROBITUSSIN AC) 100-10 mg/5 mL solution 5 mL  5 mL Oral Q4H PRN    albuterol (PROVENTIL HFA, VENTOLIN HFA, PROAIR HFA) inhaler 2 Puff  2 Puff Inhalation Q4H PRN    sodium chloride (NS) flush 5-10 mL  5-10 mL IntraVENous PRN    nitroglycerin (NITROSTAT) tablet 0.4 mg  0.4 mg SubLINGual Q5MIN PRN    acetaminophen (TYLENOL) tablet 650 mg  650 mg Oral Q4H PRN    HYDROcodone-acetaminophen (NORCO) 7.5-325 mg per tablet 1 Tab  1 Tab Oral Q4H PRN    carvedilol (COREG) tablet 6.25 mg  6.25 mg Oral BID WITH MEALS    lisinopril (PRINIVIL, ZESTRIL) tablet 2.5 mg  2.5 mg Oral DAILY    ondansetron (ZOFRAN) injection 4 mg  4 mg IntraVENous Q4H PRN    nitroglycerin (NITROBID) 2 % ointment 1 Inch  1 Inch Topical Q6H       Review of Systems    Constitutional: negative for fever, chills, sweats  Cardiovascular: negative for chest pain, palpitations, syncope, edema  Gastrointestinal:  negative for dysphagia, reflux, vomiting, diarrhea, abdominal pain, or melena  Neurologic:  negative for focal weakness, numbness, headache    Objective:     Vitals:    01/17/17 2118 01/18/17 0037 01/18/17 0601 01/18/17 0802   BP: (!) 154/101 137/72 127/85 (!) 129/92   Pulse: 79 66 64 66   Resp: 18 18 18 16   Temp: 98.1 °F (36.7 °C) 98 °F (36.7 °C) 97.6 °F (36.4 °C) 97.7 °F (36.5 °C)   SpO2: 94% 93% 90% 94%   Weight:   169 lb 6.4 oz (76.8 kg)      Intake and Output:   01/16 1901 - 01/18 0700  In: -   Out: 2550 [Urine:2550]  01/18 0701 - 01/18 1900  In: 340 [P.O.:340]  Out: -     Physical Exam:   Constitution:  the patient is well developed and in no acute distress  EENMT:  Sclera clear, pupils equal, oral mucosa moist  Respiratory: clear  Cardiovascular:  RRR without M,G,R  Gastrointestinal: soft and non-tender; with positive bowel sounds. Musculoskeletal: warm without cyanosis. There is no lower leg edema. Skin:  no jaundice or rashes, no wounds   Neurologic: no gross neuro deficits     Psychiatric:  alert and oriented x 3    CHEST XRAY:       LAB     Recent Labs      01/18/17   0500  01/17/17 2010   WBC  13.4*  14.6*   HGB  13.8  14.2   HCT  41.0*  41.5   PLT  255  248     Recent Labs      01/18/17   0515  01/18/17   0512  01/18/17   0041  01/17/17 2010   NA   --   141   --   142   K   --   3.7   --   3.7   CL   --   102   --   104   CO2   --   29   --   28   GLU   --   149*   --   170*   BUN   --   27*   --   21   CREA   --   1.45   --   1.30   MG   --   2.2   --   2.0   CA   --   9.1   --   9.0   TROIQ  0.11*   --   0.11*  0.11*   ALB   --    --    --   3.6   TBILI   --    --    --   0.7   ALT   --    --    --   51   SGOT   --    --    --   16   BNPP   --    --    --   511     No results for input(s): PH, PCO2, PO2, HCO3 in the last 72 hours. No results for input(s): LCAD, LAC in the last 72 hours.       Assessment:  (Medical Decision Making)     Hospital Problems  Date Reviewed: 1/17/2017          Codes Class Noted POA    * (Principal)Acute on chronic systolic (congestive) heart failure (HCC) ICD-10-CM: I50.23  ICD-9-CM: 428.23, 428.0  1/17/2017 Yes        Hypertensive urgency ICD-10-CM: I16.0  ICD-9-CM: 401.9  1/17/2017 Yes        Suspected sleep apnea has symptoms as snoring,DHS, witnessed apneas ICD-10-CM: G47.30  ICD-9-CM: 780.57  1/17/2017 Yes        Dyspnea ICD-10-CM: R06.00  ICD-9-CM: 786.09  1/17/2017 Yes        Pleural effusion ICD-10-CM: J90  ICD-9-CM: 511.9  1/17/2017 Yes              Plan:  (Medical Decision Making)     --heart cath in AM  - will arrange PSG as outpatient    More than 50% of the time documented was spent in face-to-face contact with the patient and in the care of the patient on the floor/unit where the patient is located.     Renan Plascencia MD

## 2017-01-18 NOTE — PROGRESS NOTES
SW completed chart review. Pt working full time, has private insurance, discharged from PT after having met all goals, functioning independently. No apparent D/C needs. SW will follow and remain available as needed.

## 2017-01-18 NOTE — PROGRESS NOTES
Problem: Mobility Impaired (Adult and Pediatric)  Goal: *Acute Goals and Plan of Care (Insert Text)  DISCHARGE GOALS: ALL GOALS MET  (1.)Mr. Ivan Quesada will move from supine to sit and sit to supine , scoot up and down and roll side to side with INDEPENDENT within 1 day(s). GOAL MET 1/18/2017   (2.)Mr. Ivan Quesada will transfer from bed to chair and chair to bed with INDEPENDENT using the least restrictive device within 1 day(s). GOAL MET 1/18/2017   (3.)Mr. Ivan Quesada will ambulate with INDEPENDENT for 500 feet with the least restrictive device within 1 day(s). GOAL MET 1/18/2017       PHYSICAL THERAPY: INITIAL ASSESSMENT, DISCHARGE, TREATMENT DAY: DAY OF ASSESSMENT, AM 1/18/2017  INPATIENT: Hospital Day: 2  Payor: [address] [place] [address] / Plan: [address] / Product Type: PPO /      NAME/AGE/GENDER: Caitlyn Toth is a 40 y.o. male   PRIMARY DIAGNOSIS: new onset chf  Acute on chronic systolic (congestive) heart failure (HCC) Acute on chronic systolic (congestive) heart failure (HCC) Acute on chronic systolic (congestive) heart failure (HCC)        ICD-10: Treatment Diagnosis:       · Generalized Muscle Weakness (M62.81)  · Difficulty in walking, Not elsewhere classified (R26.2)   Precaution/Allergies:  Review of patient's allergies indicates no known allergies. ASSESSMENT:      Mr. Ivan Quesada presents up in chair with wife present in room, agreeable to therapy. At baseline, he works full time as a . He ambulates independently and has no difficulty with ADLs at baseline. He stood and ambulated 500 feet independently without difficulty. No SOB displayed. MMT 5/5 and functional ROM exhibited. He has met all acute PT goals and resultantly will be DCd at this time. Thank you for this referral.                            RECOMMENDED REHABILITATION/EQUIPMENT: (at time of discharge pending progress): None.                    HISTORY:   History of Present Injury/Illness (Reason for Referral):  CHF  Past Medical History/Comorbidities:   Mr. Vandana Pelletier  has no past medical history on file. Mr. Vandana Pelletier  has a past surgical history that includes cholecystectomy. Social History/Living Environment:      Prior Level of Function/Work/Activity:  Independent, ambulates independently   Number of Personal Factors/Comorbidities that affect the Plan of Care: 0: LOW COMPLEXITY   EXAMINATION:   Most Recent Physical Functioning:   Gross Assessment:  AROM: Within functional limits  PROM: Within functional limits  Strength: Within functional limits  Coordination: Within functional limits  Tone: Normal  Sensation: Intact               Posture:  Posture (WDL): Exceptions to WDL  Posture Assessment: Forward head  Balance:  Sitting: Intact  Standing: Intact Bed Mobility:  Rolling: Independent  Supine to Sit: Independent  Scooting: Independent  Wheelchair Mobility:     Transfers:  Sit to Stand: Independent  Stand to Sit: Independent  Bed to Chair: Independent  Gait:     Distance (ft): 500 Feet (ft)  Ambulation - Level of Assistance: Independent       Body Structures Involved:  1. Bones  2. Joints  3. Muscles  4. Ligaments Body Functions Affected:  1. Neuromusculoskeletal  2. Movement Related Activities and Participation Affected:  1. Mobility  2. Self Care   Number of elements that affect the Plan of Care: 1-2: LOW COMPLEXITY   CLINICAL PRESENTATION:   Presentation: Stable and uncomplicated: LOW COMPLEXITY   CLINICAL DECISION [facility] [address] [address] AM-PAC 6 Clicks   Basic Mobility Inpatient Short Form  How much difficulty does the patient currently have. .. Unable A Lot A Little None   1. Turning over in bed (including adjusting bedclothes, sheets and blankets)? [ ] 1   [ ] 2   [ ] 3   [X] 4   2. Sitting down on and standing up from a chair with arms ( e.g., wheelchair, bedside commode, etc.)   [ ] 1   [ ] 2   [ ] 3   [X] 4   3. Moving from lying on back to sitting on the side of the bed?    [ ] 1   [ ] 2   [ ] 3   [X] 4   How much help from another person does the patient currently need. .. Total A Lot A Little None   4. Moving to and from a bed to a chair (including a wheelchair)? [ ] 1   [ ] 2   [ ] 3   [X] 4   5. Need to walk in hospital room? [ ] 1   [ ] 2   [ ] 3   [X] 4   6. Climbing 3-5 steps with a railing? [ ] 1   [ ] 2   [ ] 3   [X] 4   © 2007, Trustees of [address] [place] [address] [address] [address], under license to [employer]. All rights reserved    Score:  Initial: 24 Most Recent: X (Date: -- )     Interpretation of Tool:  Represents activities that are increasingly more difficult (i.e. Bed mobility, Transfers, Gait). Score 24 23 22-20 19-15 14-10 9-7 6       Modifier CH CI CJ CK CL CM CN         · Mobility - Walking and Moving Around:               - CURRENT STATUS:    CH - 0% impaired, limited or restricted               - GOAL STATUS:           CH - 0% impaired, limited or restricted               - D/C STATUS:                       ---------------To be determined---------------  Payor: BL[facility] CROSS [facility] / Plan: [place] / Product Type: PPO /       Medical Necessity:     · Eval, DC  ·    Use of outcome tool(s) and clinical judgement create a POC that gives a: Clear prediction of patient's progress: LOW COMPLEXITY                 TREATMENT:   (In addition to Assessment/Re-Assessment sessions the following treatments were rendered)   Pre-treatment Symptoms/Complaints:  None  Pain: Initial:   Pain Intensity 1: 0  Post Session:  0/10      Therapeutic Activity: (    8 MINUTE):  Therapeutic activities including Bed transfers, Chair transfers and Ambulation on level ground to improve mobility, strength, balance and coordination. Required no   to promote static and dynamic balance in standing.             Treatment/Session Assessment:    · Response to Treatment:  Verbalized understanding of PT and DC  · Interdisciplinary Collaboration:  · Physical Therapist  · Registered Nurse  · After treatment position/precautions:  · Up in chair  · Bed/Chair-wheels locked  · Bed in low position  · Call light within reach  · RN notified  · Family at bedside  · Compliance with Program/Exercises: Will assess as treatment progresses. · Recommendations/Intent for next treatment session: \"Next visit will focus on advancements to more challenging activities and reduction in assistance provided\".   Total Treatment Duration:  PT Patient Time In/Time Out  Time In: 0908  Time Out: [address] Manas William DPT

## 2017-01-18 NOTE — ROUTINE PROCESS
CHF teaching started post introduction to pt/family; aware of diagnosis. Planner/needs scale @ BS and will follow. Smoking/ ETOH/Illicit drug use cessation covered.  Pt/family aware that I can not prescribe nor adjust  medications: 15mins  Palliative Care score: <20  Start 2 L/D FR

## 2017-01-19 LAB
ANION GAP BLD CALC-SCNC: 8 MMOL/L (ref 7–16)
BUN SERPL-MCNC: 34 MG/DL (ref 6–23)
CALCIUM SERPL-MCNC: 8.9 MG/DL (ref 8.3–10.4)
CHLORIDE SERPL-SCNC: 102 MMOL/L (ref 98–107)
CO2 SERPL-SCNC: 31 MMOL/L (ref 21–32)
CREAT SERPL-MCNC: 1.49 MG/DL (ref 0.8–1.5)
GLUCOSE SERPL-MCNC: 139 MG/DL (ref 65–100)
MAGNESIUM SERPL-MCNC: 2.4 MG/DL (ref 1.8–2.4)
POTASSIUM SERPL-SCNC: 3.7 MMOL/L (ref 3.5–5.1)
SODIUM SERPL-SCNC: 141 MMOL/L (ref 136–145)
TROPONIN I SERPL-MCNC: 0.07 NG/ML (ref 0.02–0.05)
TROPONIN I SERPL-MCNC: 0.09 NG/ML (ref 0.02–0.05)

## 2017-01-19 PROCEDURE — 36415 COLL VENOUS BLD VENIPUNCTURE: CPT | Performed by: PHYSICIAN ASSISTANT

## 2017-01-19 PROCEDURE — 80048 BASIC METABOLIC PNL TOTAL CA: CPT | Performed by: PHYSICIAN ASSISTANT

## 2017-01-19 PROCEDURE — 74011250637 HC RX REV CODE- 250/637: Performed by: NURSE PRACTITIONER

## 2017-01-19 PROCEDURE — 77030004558 HC CATH ANGI DX SUPR TORQ CARD -A

## 2017-01-19 PROCEDURE — 74011250636 HC RX REV CODE- 250/636: Performed by: NURSE PRACTITIONER

## 2017-01-19 PROCEDURE — 93458 L HRT ARTERY/VENTRICLE ANGIO: CPT

## 2017-01-19 PROCEDURE — 74011250637 HC RX REV CODE- 250/637: Performed by: PHYSICIAN ASSISTANT

## 2017-01-19 PROCEDURE — 77030015766

## 2017-01-19 PROCEDURE — 74011000250 HC RX REV CODE- 250: Performed by: INTERNAL MEDICINE

## 2017-01-19 PROCEDURE — 84484 ASSAY OF TROPONIN QUANT: CPT | Performed by: PHYSICIAN ASSISTANT

## 2017-01-19 PROCEDURE — 99152 MOD SED SAME PHYS/QHP 5/>YRS: CPT

## 2017-01-19 PROCEDURE — C1769 GUIDE WIRE: HCPCS

## 2017-01-19 PROCEDURE — 65660000000 HC RM CCU STEPDOWN

## 2017-01-19 PROCEDURE — B2111ZZ FLUOROSCOPY OF MULTIPLE CORONARY ARTERIES USING LOW OSMOLAR CONTRAST: ICD-10-PCS | Performed by: INTERNAL MEDICINE

## 2017-01-19 PROCEDURE — 74011250636 HC RX REV CODE- 250/636: Performed by: INTERNAL MEDICINE

## 2017-01-19 PROCEDURE — 74011250637 HC RX REV CODE- 250/637: Performed by: INTERNAL MEDICINE

## 2017-01-19 PROCEDURE — 83735 ASSAY OF MAGNESIUM: CPT | Performed by: PHYSICIAN ASSISTANT

## 2017-01-19 PROCEDURE — 74011250636 HC RX REV CODE- 250/636

## 2017-01-19 PROCEDURE — B2151ZZ FLUOROSCOPY OF LEFT HEART USING LOW OSMOLAR CONTRAST: ICD-10-PCS | Performed by: INTERNAL MEDICINE

## 2017-01-19 PROCEDURE — 4A023N7 MEASUREMENT OF CARDIAC SAMPLING AND PRESSURE, LEFT HEART, PERCUTANEOUS APPROACH: ICD-10-PCS | Performed by: INTERNAL MEDICINE

## 2017-01-19 PROCEDURE — 74011636320 HC RX REV CODE- 636/320: Performed by: INTERNAL MEDICINE

## 2017-01-19 PROCEDURE — C1894 INTRO/SHEATH, NON-LASER: HCPCS

## 2017-01-19 PROCEDURE — 77030029997 HC DEV COM RDL R BND TELE -B

## 2017-01-19 RX ORDER — CARVEDILOL 12.5 MG/1
12.5 TABLET ORAL 2 TIMES DAILY WITH MEALS
Qty: 60 TAB | Refills: 6 | Status: SHIPPED | OUTPATIENT
Start: 2017-01-19 | End: 2017-02-27 | Stop reason: SDUPTHER

## 2017-01-19 RX ORDER — FUROSEMIDE 40 MG/1
40 TABLET ORAL 2 TIMES DAILY
Qty: 60 TAB | Refills: 6 | Status: SHIPPED | OUTPATIENT
Start: 2017-01-19 | End: 2017-01-20

## 2017-01-19 RX ORDER — FENTANYL CITRATE 50 UG/ML
25-100 INJECTION, SOLUTION INTRAMUSCULAR; INTRAVENOUS
Status: DISCONTINUED | OUTPATIENT
Start: 2017-01-19 | End: 2017-01-20 | Stop reason: HOSPADM

## 2017-01-19 RX ORDER — SODIUM CHLORIDE 0.9 % (FLUSH) 0.9 %
5-10 SYRINGE (ML) INJECTION EVERY 8 HOURS
Status: DISCONTINUED | OUTPATIENT
Start: 2017-01-19 | End: 2017-01-20 | Stop reason: HOSPADM

## 2017-01-19 RX ORDER — CARVEDILOL 12.5 MG/1
12.5 TABLET ORAL 2 TIMES DAILY WITH MEALS
Status: DISCONTINUED | OUTPATIENT
Start: 2017-01-19 | End: 2017-01-20 | Stop reason: HOSPADM

## 2017-01-19 RX ORDER — MIDAZOLAM HYDROCHLORIDE 1 MG/ML
.5-2 INJECTION, SOLUTION INTRAMUSCULAR; INTRAVENOUS
Status: DISCONTINUED | OUTPATIENT
Start: 2017-01-19 | End: 2017-01-20 | Stop reason: HOSPADM

## 2017-01-19 RX ORDER — FUROSEMIDE 40 MG/1
40 TABLET ORAL EVERY 12 HOURS
Status: DISCONTINUED | OUTPATIENT
Start: 2017-01-20 | End: 2017-01-20

## 2017-01-19 RX ORDER — LISINOPRIL 5 MG/1
10 TABLET ORAL DAILY
Status: DISCONTINUED | OUTPATIENT
Start: 2017-01-20 | End: 2017-01-20 | Stop reason: HOSPADM

## 2017-01-19 RX ORDER — HEPARIN SODIUM 200 [USP'U]/100ML
3 INJECTION, SOLUTION INTRAVENOUS CONTINUOUS
Status: DISCONTINUED | OUTPATIENT
Start: 2017-01-19 | End: 2017-01-19

## 2017-01-19 RX ORDER — LISINOPRIL 10 MG/1
10 TABLET ORAL DAILY
Qty: 30 TAB | Refills: 6 | Status: SHIPPED | OUTPATIENT
Start: 2017-01-20 | End: 2017-01-25 | Stop reason: DRUGHIGH

## 2017-01-19 RX ORDER — SODIUM CHLORIDE 9 MG/ML
250 INJECTION, SOLUTION INTRAVENOUS CONTINUOUS
Status: DISPENSED | OUTPATIENT
Start: 2017-01-19 | End: 2017-01-19

## 2017-01-19 RX ORDER — LIDOCAINE HYDROCHLORIDE 20 MG/ML
5-10 INJECTION, SOLUTION INFILTRATION; PERINEURAL ONCE
Status: COMPLETED | OUTPATIENT
Start: 2017-01-19 | End: 2017-01-19

## 2017-01-19 RX ORDER — SODIUM CHLORIDE 0.9 % (FLUSH) 0.9 %
5-10 SYRINGE (ML) INJECTION AS NEEDED
Status: DISCONTINUED | OUTPATIENT
Start: 2017-01-19 | End: 2017-01-20 | Stop reason: HOSPADM

## 2017-01-19 RX ADMIN — ATORVASTATIN CALCIUM 80 MG: 40 TABLET, FILM COATED ORAL at 23:24

## 2017-01-19 RX ADMIN — IOVERSOL 75 ML: 741 INJECTION INTRA-ARTERIAL; INTRAVENOUS at 13:39

## 2017-01-19 RX ADMIN — LIDOCAINE HYDROCHLORIDE 40 MG: 20 INJECTION, SOLUTION INFILTRATION; PERINEURAL at 13:21

## 2017-01-19 RX ADMIN — LISINOPRIL 2.5 MG: 5 TABLET ORAL at 10:39

## 2017-01-19 RX ADMIN — MIDAZOLAM HYDROCHLORIDE 2 MG: 1 INJECTION, SOLUTION INTRAMUSCULAR; INTRAVENOUS at 13:20

## 2017-01-19 RX ADMIN — NITROGLYCERIN 1 INCH: 20 OINTMENT TOPICAL at 00:07

## 2017-01-19 RX ADMIN — ASPIRIN 81 MG CHEWABLE TABLET 81 MG: 81 TABLET CHEWABLE at 10:38

## 2017-01-19 RX ADMIN — CARVEDILOL 12.5 MG: 12.5 TABLET, FILM COATED ORAL at 20:07

## 2017-01-19 RX ADMIN — SODIUM CHLORIDE 75 ML/HR: 900 INJECTION, SOLUTION INTRAVENOUS at 00:08

## 2017-01-19 RX ADMIN — HEPARIN SODIUM: 10000 INJECTION, SOLUTION INTRAVENOUS; SUBCUTANEOUS at 13:23

## 2017-01-19 RX ADMIN — FENTANYL CITRATE 50 MCG: 50 INJECTION, SOLUTION INTRAMUSCULAR; INTRAVENOUS at 13:20

## 2017-01-19 RX ADMIN — Medication 10 ML: at 23:26

## 2017-01-19 RX ADMIN — AMOXICILLIN AND CLAVULANATE POTASSIUM 1 TABLET: 875; 125 TABLET, FILM COATED ORAL at 23:24

## 2017-01-19 RX ADMIN — NITROGLYCERIN 1 INCH: 20 OINTMENT TOPICAL at 10:40

## 2017-01-19 RX ADMIN — CARVEDILOL 6.25 MG: 6.25 TABLET, FILM COATED ORAL at 10:39

## 2017-01-19 RX ADMIN — HEPARIN SODIUM 3 UNITS/HR: 200 INJECTION, SOLUTION INTRAVENOUS at 13:00

## 2017-01-19 RX ADMIN — AMOXICILLIN AND CLAVULANATE POTASSIUM 1 TABLET: 875; 125 TABLET, FILM COATED ORAL at 10:49

## 2017-01-19 NOTE — PROGRESS NOTES
Report received from [address] [place], [place] [address]Mateo Cath Lab RN and Irish Culver RN. Procedural findings communicated. Intra procedural  medication administration reviewed. Progression of care discussed.      Patient received into [address] [facility] 5 post sheath removal.     Access site without bleeding or swelling yes    Dressing dry and intact yes    Patient instructed to limit movement to right upper extremity    Routine post procedural vital signs and site assessment initiated yes

## 2017-01-19 NOTE — PROGRESS NOTES
Bedside and Verbal shift change report given to Shayne Mei RN (oncoming nurse) by Manas Sanabria RN (offgoing nurse). Report included the following information SBAR, Kardex, Accordion and Recent Results.

## 2017-01-19 NOTE — DISCHARGE SUMMARY
Physician Discharge Summary     Patient ID: Sarmad Lopes  [id number]  40 y.o.  1970    Admit date: 1/17/2017    Discharge date and time: 1/19/2017    Admitting Physician: Blanca Ren MD     Primary Cardiologist:Dr. Whit Aguilera MD    Discharge Physician: Pepito Calvillo NP/ Dr. Saurav Szymanski    Admission Diagnoses: new onset chf  Acute on chronic systolic (congestive) heart failure [facility])    Discharge Diagnoses:   Patient Active Problem List    Diagnosis Date Noted    Acute on chronic systolic (congestive) heart failure ([facility] [address] 01/17/2017    Hypertensive urgency 01/17/2017    Suspected sleep apnea 01/17/2017    Dyspnea 01/17/2017    Pleural effusion 01/17/2017           Hospital Course: Sarmad Lopes was admitted to the hospital on 1/17/2017 with complaints of progressive YAO and echo in office with initial evaluation in office noting poor EF. He was admitted to hospital for diuresis and further evaluation. Serial troponin levels where minimally elevated. Cardiac cath noted normal coronary arteries. His EF was documented at 25 %. He was initiated on coreg, lisinopril and po lasix. He has mild CKD stage 3. Follow up in our office in one week with labs. He has been informed of importance of his new medications and Heart failure restrictions including fluid, sodium restrictions and daily weights. Arrangements have been made for Lifevest placement given his poor EF and increased risk for Sudden death. He did not demonstrate any ventricular arrhythmias on telemetry. Discharge Exam:     Visit Vitals    /69    Pulse 72    Temp 97.5 °F (36.4 °C)    Resp 14    Wt 77.1 kg (169 lb 14.4 oz)    SpO2 94%    BMI 27.22 kg/m2     General Appearance:  Well developed, well nourished,alert and oriented x 3, and individual in no acute distress. Ears/Nose/Mouth/Throat:   Hearing grossly normal.         Neck: Supple. Chest:   Lungs clear to auscultation bilaterally. Cardiovascular:  Regular rate and rhythm, S1, S2 normal, no murmur. Abdomen:   Soft, non-tender, bowel sounds are active. Extremities: No edema bilaterally. Skin: Warm and dry. Final Laboratory Data:  Recent Results (from the past 24 hour(s))   TROPONIN I    Collection Time: 01/18/17 10:30 PM   Result Value Ref Range    Troponin-I, Qt. 0.10 (HH) 0.02 - 0.05 NG/ML   TROPONIN I    Collection Time: 01/19/17 12:50 AM   Result Value Ref Range    Troponin-I, Qt. 0.09 (H) 0.02 - 4.06 NG/ML   METABOLIC PANEL, BASIC    Collection Time: 01/19/17  5:37 AM   Result Value Ref Range    Sodium 141 136 - 145 mmol/L    Potassium 3.7 3.5 - 5.1 mmol/L    Chloride 102 98 - 107 mmol/L    CO2 31 21 - 32 mmol/L    Anion gap 8 7 - 16 mmol/L    Glucose 139 (H) 65 - 100 mg/dL    BUN 34 (H) 6 - 23 MG/DL    Creatinine 1.49 0.8 - 1.5 MG/DL    GFR est AA >60 >60 ml/min/1.73m2    GFR est non-AA 54 (L) >60 ml/min/1.73m2    Calcium 8.9 8.3 - 10.4 MG/DL   MAGNESIUM    Collection Time: 01/19/17  5:37 AM   Result Value Ref Range    Magnesium 2.4 1.8 - 2.4 mg/dL   TROPONIN I    Collection Time: 01/19/17  5:37 AM   Result Value Ref Range    Troponin-I, Qt. 0.07 (H) 0.02 - 0.05 NG/ML       Disposition: home    Current Discharge Medication List      START taking these medications    Details   furosemide (LASIX) 40 mg tablet Take 1 Tab by mouth daily. Qty: 30 Tab, Refills: 6      spironolactone (ALDACTONE) 25 mg tablet Take 1 Tab by mouth daily. Qty: 30 Tab, Refills: 6      carvedilol (COREG) 12.5 mg tablet Take 1 Tab by mouth two (2) times daily (with meals). Qty: 60 Tab, Refills: 6      lisinopril (PRINIVIL, ZESTRIL) 10 mg tablet Take 1 Tab by mouth daily. Qty: 30 Tab, Refills: 6         CONTINUE these medications which have NOT CHANGED    Details   albuterol (VENTOLIN HFA) 90 mcg/actuation inhaler Take 1-2 Puffs by inhalation every six (6) hours as needed for Wheezing.          STOP taking these medications amoxicillin-clavulanate (AUGMENTIN) 875-125 mg per tablet Comments:   Reason for Stopping:         guaiFENesin-codeine (ROBITUSSIN AC) 100-10 mg/5 mL solution Comments:   Reason for Stopping:         nebivolol (BYSTOLIC) 10 mg tablet Comments:   Reason for Stopping:         chlorthalidone (HYGROTEN) 25 mg tablet Comments:   Reason for Stopping:         amoxicillin-clavulanate (AUGMENTIN) 875-125 mg per tablet Comments:   Reason for Stopping:         GUAIFENESIN AC  mg/5 mL solution Comments:   Reason for Stopping:                   Referenced discharge instructions provided by nursing for diet and activity. Follow-up:  Primary Cardiologist:Dr. Walters--Dr. Taylor Flies not available in one week. PCP: Merline Lone, MD) in about 4 weeks.     Signed:  Terrence Lau NP  1/19/2017  2:15 PM    Morgan Marin MD

## 2017-01-19 NOTE — PROCEDURES
[address]       Name:  Andrew Shipman   MR#:  [id number]   :  1970   Account #:  [de-identified]   Date of Adm:  2017       DATE OF PROCEDURE: 2017    PRIMARY CARDIOLOGIST: Greg Ayala MD    PRIMARY CARE PHYSICIAN: Ceasar Larsen MD    BRIEF HISTORY: Mr. Randal Schmidt is a very pleasant gentleman with   recent history of increasing shortness of breath, abnormal EKG. He was treated for outpatient upper respiratory tract infection   and then admitted with acute systolic heart failure. The   patient has improved with diuretics. His breathing is markedly   better. He is able lie flat. Given the risk for underlying   ischemic-mediated etiology, the patient has been referred for   cardiac catheterization. PROCEDURE: After informed consent, the patient was prepped and   draped in the usual sterile fashion. The right wrist was   infiltrated with lidocaine. The right radial artery was accessed   via micropuncture technique and a 6-Icelandic sheath was advanced   without complications. A 6-Icelandic Tiger catheter was utilized   for left and right coronary injections, and a 6-Icelandic angled   pigtail was utilized for left ventriculography. Isovue contrast   utilized. FINDINGS   1. Left ventricle: The left ventricle is moderate to severely   dilated. The left ventricular ejection fraction is severely reduced   at 25%. Left end-diastolic pressure is measured at 18 mmHg. There is no significant mitral regurgitation. There is no aortic   valve gradient. 2. Left main: The left main is large, trifurcates in the LAD,   ramus, and circumflex vessels. 3. Left anterior ascending coronary: Appears angiographically   normal.   4. Left anterior descending coronary: It is a large vessel,   gives rise to a large proximal diagonal. The entire LAD diagonal   system appears angiographically normal.   5. Ramus intermedius: This is a moderate-sized vessel.  It has a   30% proximal stenosis. 6. Left circumflex coronary: It is a large vessel, gives rise to   a moderate-sized first obtuse marginal and a small bifurcating   second obtuse marginal. The entire system appears   angiographically normal.   7. Right coronary: This is a moderate size anatomically dominant   vessel, gives rise to a moderate-sized posterior descending and   a larger posterolateral branch. The entire system appears   angiographically normal.    CONCLUSION   1. Severe left ventricular systolic dysfunction with appropriate   end-diastolic pressure. 2. Minimal atherosclerotic coronary disease. Thank you for allowing us to participate in care of this   patient. If you have any questions or concerns, please feel free   to contact me.         MD ALDEN Feliz / Benson Fulton   D:  01/19/2017   13:51   T:  01/19/2017   14:15   Job #:  [id number]

## 2017-01-19 NOTE — PROGRESS NOTES
TRANSFER - OUT REPORT:    Verbal report given to Renetta/Zulma on Tenzin Arellano  being transferred to CPRU/Tele 310 for routine progression of care       Report consisted of patients Situation, Background, Assessment and   Recommendations(SBAR). Information from the following report(s) SBAR, Procedure Summary and MAR was reviewed with the receiving nurse. Opportunity for questions and clarification was provided. Procedure: [facility]   Finding Summary: No interventions  Location: R wrist   Closure Device: R band at 1331 with 11ml  Post Site Assessment: No bleeding, no hematoma         Intra Procedure Meds:    Versed: 2mg  Fentanyl: 50mcg             Peripheral IV 01/17/17 Right Antecubital (Active)   Site Assessment Clean, dry, & intact 1/19/2017  5:00 AM   Phlebitis Assessment 0 1/19/2017  5:00 AM   Infiltration Assessment 0 1/19/2017  5:00 AM   Dressing Status Clean, dry, & intact 1/19/2017  5:00 AM   Dressing Type Tape;Transparent 1/19/2017  5:00 AM   Hub Color/Line Status Infusing 1/19/2017  5:00 AM        Post-Procedure Site Assessment (1)  Wound Type: Catheter entry/exit  Location: Radial  Orientation : Right  Closure Device:  (R band at 1331 with 11ml)  Site Assessment: No bleeding, No hematoma                       has No Known Allergies. History reviewed. No pertinent past medical history.   Visit Vitals    /68 (BP 1 Location: Left arm, BP Patient Position: At rest)    Pulse 67    Temp 98.4 °F (36.9 °C)    Resp 14    Wt 77.1 kg (169 lb 14.4 oz)    SpO2 97%    BMI 27.22 kg/m2

## 2017-01-19 NOTE — ROUTINE PROCESS
CHF teaching started post introduction to pt/family; aware of diagnosis. Planner/scale @ BS and will follow. Smoking/ ETOH/Illicit drug use cessation covered. Pt/family aware that I can not prescribe nor adjust  medications: 15mins  Palliative Care score:  Start 2 L/D FR  CHF teaching continues to pt/family. Emphasis on taking prescription meds as ordered, to keep F/U appts and to call MD STAT if any of the following occur:   If you gain 2 lbs in one day or 5 lbs in a week, and short of breath.  If you can not lay flat without developing short of breath or rapid breathing at night; or if it wakes you up. Develop a cough or wheezing.  If you notice swollen hands/feet/ankles or stomach with a bloated/ full feeling.  If you become confused or mentally fuzzy or dizzy.  If you notice a rapid or change in your heart rate.  If you become more exhausted all the time and unable to do the same level of activity without stopping to catch your breath. Drink no more than 8 cups a day in 8 oz. cups. Your Heart can not handle any more. Stay away from salt (limit anything with salt or sodium in it). Limit to 250mg per serving.   Pt/family verbalizes understanding, will follow to reinforce teaching skills: 20 mins

## 2017-01-19 NOTE — ROUTINE PROCESS
TRANSFER - IN REPORT:    Verbal report received from cath lab on Yeny Maldonado  being received from cath lab for routine progression of care      Report consisted of patients Situation, Background, Assessment and   Recommendations(SBAR). Information from the following report(s) Kardex was reviewed with the receiving nurse. Opportunity for questions and clarification was provided. Assessment completed upon patients arrival to unit and care assumed.

## 2017-01-19 NOTE — PROGRESS NOTES
Pt chart reviewed for pending LHC via R wrist with R groin backup. Confirmed signed pt consent present on chart. Pt received ASA 81mg today at 1038.  Pt currently denies cp

## 2017-01-19 NOTE — ADT AUTH CERT NOTES
Patient Demographics        Patient Name [address] Sex  Address Phone       Kelvin De La O [phone] Male 1970 [address]  [place] [phone] [phone] (Home)           CSN:       [id number]           Admit Date: Admit Time Room Bed       2017  6:25  [[id number]] 01 [795]           Attending Providers        Provider Pager From To       Hayden Esteban MD  17            Emergency Contact(s)        Name Relation Home Work Mobile       Danielle Webb Spouse [phone]           Utilization Review           Heart Failure - Care Day 2 (2017) by Virginia Wesley RN        Review Entered Review Status       2017 Completed       Details              Care Day: 2 Care Date: 2017 Level of Care: Telemetry       Guideline Day 2        Level Of Care       (X) Intermediate care or floor              Clinical Status       (X) * Hemodynamic stability       (X) * Mental status at baseline       ( ) * MI excluded       (X) * Cardiac rate and rhythm acceptable       (X) * Oxygenation at baseline or improved       ( ) * Pulmonary edema absent or improved              Activity       (X) Advance activity as tolerated              Routes       (X) Oral or parenteral medications       (X) Low-salt diet              Interventions       (X) * Pulmonary catheter absent       (X) Weigh              Medications       (X) Taper parenteral medication and optimize oral medication       (X) Diuretics       (X) ACE inhibitor or ARB       (X) Possible nitrates, digoxin, hydralazine                                   * Milestone              Additional Notes       CONT STAY REVIEW FOR  17              Pulmonary Disease Progress Notes       Subjective:               Feels better       On RA       For heat cath in AM       Assessment: (Medical Decision Making)               Hospital Problems Date Reviewed: 2017             Codes Class Noted POA         * (Principal)Acute on chronic systolic (congestive) heart failure (HCC) ICD-10-CM: I50.23       ICD-9-CM: 428.23, 428.0 1/17/2017 Yes                  Hypertensive urgency ICD-10-CM: I16.0       ICD-9-CM: 401.9 1/17/2017 Yes                  Suspected sleep apnea has symptoms as snoring,DHS, witnessed apneas ICD-10-CM: G47.30       ICD-9-CM: 780.57 1/17/2017 Yes                  Dyspnea ICD-10-CM: R06.00       ICD-9-CM: 786.09 1/17/2017 Yes                  Pleural effusion ICD-10-CM: J90       ICD-9-CM: 511.9 1/17/2017 Yes                                       Plan: (Medical Decision Making)               --heart cath in AM       - will arrange PSG as outpatient       WBC: 13.4 (H)       RBC: 4.36       HGB: 13.8       HCT: 41.0 (L)       Glucose: 149 (H)       BUN: 27 (H)       GFR est non-AA: 56 (L)       LDL, calculated: 121.4 (H)       Troponin-I, Qt.: 0.11 (HH)-Troponin-I, Qt.: 0.13 (HH)       VSS, SAT 94% RA, TELE, I&O, WT QD, CARDIAC DIET, PT/SOCIAL WORK CONSULTS       AUGMENTIN PO Q 12, ASA,  COREG PO BID, NTG TP, PRINIVIL,       LIPITOR,  LASIX IV DRIP D/C

## 2017-01-19 NOTE — PROCEDURES
Brief Cardiac Procedure Note    Patient: Tawanda Bradshaw MRN: [id number]  SSN: [id number]    YOB: 1970  Age: 40 y.o. Sex: male      Date of Procedure: 1/19/2017     Pre-procedure Diagnosis: Congestive Heart Failure    Post-procedure Diagnosis: Congestive Heart Failure    Procedure: Left Heart Catheterization    Brief Description of Procedure: See note    Performed By: Saba Traore MD     Assistants: None    Anesthesia: Moderate Sedation    Estimated Blood Loss: Less than 10 mL      Specimens: None    Implants: None    Findings:   LV:  EF 25%  LM:  NML  LAD:  NML  LCx:  NML  RCA:  NML    Complications: None    Recommendations: Continue medical therapy.     Signed By: Saba Traore MD     January 19, 2017              ]

## 2017-01-19 NOTE — PROGRESS NOTES
Life Vest Order faxed to [employer].   Update: Jan 20th- Life Vest to be fitted at [facility]

## 2017-01-20 VITALS
DIASTOLIC BLOOD PRESSURE: 68 MMHG | SYSTOLIC BLOOD PRESSURE: 108 MMHG | OXYGEN SATURATION: 97 % | RESPIRATION RATE: 18 BRPM | HEART RATE: 62 BPM | BODY MASS INDEX: 26.33 KG/M2 | TEMPERATURE: 96.6 F | WEIGHT: 164.4 LBS

## 2017-01-20 LAB
ANION GAP BLD CALC-SCNC: 10 MMOL/L (ref 7–16)
BUN SERPL-MCNC: 29 MG/DL (ref 6–23)
CALCIUM SERPL-MCNC: 8.7 MG/DL (ref 8.3–10.4)
CHLORIDE SERPL-SCNC: 105 MMOL/L (ref 98–107)
CO2 SERPL-SCNC: 26 MMOL/L (ref 21–32)
CREAT SERPL-MCNC: 1.39 MG/DL (ref 0.8–1.5)
GLUCOSE SERPL-MCNC: 114 MG/DL (ref 65–100)
MAGNESIUM SERPL-MCNC: 2.4 MG/DL (ref 1.8–2.4)
POTASSIUM SERPL-SCNC: 4.3 MMOL/L (ref 3.5–5.1)
SODIUM SERPL-SCNC: 141 MMOL/L (ref 136–145)

## 2017-01-20 PROCEDURE — 74011250637 HC RX REV CODE- 250/637: Performed by: NURSE PRACTITIONER

## 2017-01-20 PROCEDURE — 36415 COLL VENOUS BLD VENIPUNCTURE: CPT | Performed by: PHYSICIAN ASSISTANT

## 2017-01-20 PROCEDURE — 74011250637 HC RX REV CODE- 250/637: Performed by: INTERNAL MEDICINE

## 2017-01-20 PROCEDURE — 83735 ASSAY OF MAGNESIUM: CPT | Performed by: PHYSICIAN ASSISTANT

## 2017-01-20 PROCEDURE — 74011250637 HC RX REV CODE- 250/637: Performed by: PHYSICIAN ASSISTANT

## 2017-01-20 PROCEDURE — 80048 BASIC METABOLIC PNL TOTAL CA: CPT | Performed by: PHYSICIAN ASSISTANT

## 2017-01-20 RX ORDER — SPIRONOLACTONE 25 MG/1
25 TABLET ORAL DAILY
Status: DISCONTINUED | OUTPATIENT
Start: 2017-01-20 | End: 2017-01-20 | Stop reason: HOSPADM

## 2017-01-20 RX ORDER — FUROSEMIDE 40 MG/1
40 TABLET ORAL DAILY
Status: DISCONTINUED | OUTPATIENT
Start: 2017-01-20 | End: 2017-01-20 | Stop reason: HOSPADM

## 2017-01-20 RX ORDER — FUROSEMIDE 40 MG/1
40 TABLET ORAL DAILY
Qty: 30 TAB | Refills: 6 | Status: SHIPPED | OUTPATIENT
Start: 2017-01-20 | End: 2017-01-25 | Stop reason: DRUGHIGH

## 2017-01-20 RX ORDER — SPIRONOLACTONE 25 MG/1
25 TABLET ORAL DAILY
Qty: 30 TAB | Refills: 6 | Status: SHIPPED | OUTPATIENT
Start: 2017-01-20 | End: 2017-02-27 | Stop reason: SDUPTHER

## 2017-01-20 RX ADMIN — CARVEDILOL 12.5 MG: 12.5 TABLET, FILM COATED ORAL at 08:32

## 2017-01-20 RX ADMIN — AMOXICILLIN AND CLAVULANATE POTASSIUM 1 TABLET: 875; 125 TABLET, FILM COATED ORAL at 08:32

## 2017-01-20 RX ADMIN — ASPIRIN 81 MG CHEWABLE TABLET 81 MG: 81 TABLET CHEWABLE at 08:32

## 2017-01-20 RX ADMIN — SPIRONOLACTONE 25 MG: 25 TABLET, FILM COATED ORAL at 09:25

## 2017-01-20 RX ADMIN — LISINOPRIL 10 MG: 5 TABLET ORAL at 08:32

## 2017-01-20 RX ADMIN — FUROSEMIDE 40 MG: 40 TABLET ORAL at 08:38

## 2017-01-20 NOTE — PROGRESS NOTES
[facility] CARDIOLOGY PROGRESS NOTE           1/20/2017 8:11 AM    Admit Date: 1/17/2017      Subjective:   Feels much better today . ROS:  Cardiovascular:  As noted above    Objective:      Vitals:    01/19/17 1638 01/19/17 2146 01/20/17 0208 01/20/17 0551   BP: (!) 149/94 137/84 110/72 128/90   Pulse: (!) 55 71 65 66   Resp: 18 18 17 19   Temp: 98.4 °F (36.9 °C) 97.8 °F (36.6 °C) 98.2 °F (36.8 °C) 97.4 °F (36.3 °C)   SpO2: 96% 95% 97% 95%   Weight:    74.6 kg (164 lb 6.4 oz)       Physical Exam:  General-No Acute Distress  Neck- supple, no JVD  CV- regular rate and rhythm no MRG  Lung- clear bilaterally  Abd- soft, nontender, nondistended  Ext- no edema bilaterally. Skin- warm and dry    Data Review:   Recent Labs      01/20/17   0459  01/19/17   0537  01/19/17   0050   01/18/17   0512  01/18/17   0500   01/17/17 2010   NA  141  141   --    --   141   --    --   142   K  4.3  3.7   --    --   3.7   --    --   3.7   MG  2.4  2.4   --    --   2.2   --    --   2.0   BUN  29*  34*   --    --   27*   --    --   21   CREA  1.39  1.49   --    --   1.45   --    --   1.30   GLU  114*  139*   --    --   149*   --    --   170*   WBC   --    --    --    --    --   13.4*   --   14.6*   HGB   --    --    --    --    --   13.8   --   14.2   HCT   --    --    --    --    --   41.0*   --   41.5   PLT   --    --    --    --    --   255   --   248   TROIQ   --   0.07*  0.09*   < >   --    --    < >  0.11*   CHOL   --    --    --    --   180   --    --    --    TGL   --    --    --    --   68   --    --    --    LDLC   --    --    --    --   121.4*   --    --    --    HDL   --    --    --    --   45   --    --    --     < > = values in this interval not displayed. Assessment/Plan:     Principal Problem:    Acute on chronic systolic (congestive) heart failure (HCC) (1/17/2017)--findings consistent with NICM with normal coronary arteries. Optimize meds.  Will decrease lasix to once a day and add aldactone. Discussed at length with pt the need for fluid, sodium restriction and adequate BP control, dietary modifications. EF by LV gram 25 %--high risk for SCD, ventricular arrhythmias. Active Problems:    Hypertensive urgency (1/17/2017)--much improved, continue current meds. Suspected sleep apnea (1/17/2017)      Dyspnea (1/17/2017)      Pleural effusion (1/17/2017)          Maryhelen Angelucci, NP  1/20/2017 8:11 AM      Patient seen and examined by me. Agree with above note by physician extender.   Key findings are:  sCHF  CV- RRR without MRG  Lungs- Clear bilaterally  Abdomen- soft, non-tender, normal bowel sounds  Extremities- no edema    Plan: stable to DC home      Halie Smith MD

## 2017-01-20 NOTE — PROGRESS NOTES
Bedside and Verbal shift change report given to OSBALDO An (oncoming nurse) by Dominga Pereira RN (offgoing nurse). Report included the following information SBAR, Kardex, Accordion and Recent Results.

## 2017-01-20 NOTE — PROGRESS NOTES
Discharge instructions reviewed with patient and wife. Prescriptions given for Lasix, Aldactone, Coreg, Lisinopril and med info sheets provided for all new medications. Opportunity for questions provided. Pt voiced understanding of all discharge instructions. IV and telemetry monitor removed. States he needs a work excuse- NP paged. Getting dressed and ready to go.

## 2017-01-20 NOTE — DISCHARGE INSTRUCTIONS
Cardiac Catheterization/Angiography Discharge Instructions    *Check the puncture site frequently for swelling or bleeding. If you see any bleeding, lie down and apply pressure over the area with a clean town or washcloth. Notify your doctor for any redness, swelling, drainage or oozing from the puncture site. Notify your doctor for any fever or chills. *If the leg or arm with the puncture becomes cold, numb or painful, call [facility] at [phone]    *Activity should be limited for the next 48 hours. Climb stairs as little as possible and avoid any stooping, bending or strenuous activity for 48 hours. No heavy lifting (anything over 10 pounds) for three days. *Do not drive for 48 hours. *You may resume your usual diet. Drink more fluids than usual.    *Have a responsible person drive you home and stay with you for at least 24 hours after your heart catheterization/angiography. *You may remove the bandage from your Left Wrist in 24 hours. You may shower in 24 hours. No tub baths, hot tubs or swimming for one week. Do not place any lotions, creams, powders, ointments over the puncture site for one week. You may place a clean band-aid over the puncture site each day for 5 days. Change this daily. Heart Failure: Care Instructions  Your Care Instructions    Heart failure occurs when your heart does not pump as much blood as the body needs. Failure does not mean that the heart has stopped pumping but rather that it is not pumping as well as it should. Over time, this causes fluid buildup in your lungs and other parts of your body. Fluid buildup can cause shortness of breath, fatigue, swollen ankles, and other problems. By taking medicines regularly, reducing sodium (salt) in your diet, checking your weight every day, and making lifestyle changes, you can feel better and live longer. Follow-up care is a key part of your treatment and safety.  Be sure to make and go to all appointments, and call your doctor if you are having problems. It's also a good idea to know your test results and keep a list of the medicines you take. How can you care for yourself at home? Medicines  · Be safe with medicines. Take your medicines exactly as prescribed. Call your doctor if you think you are having a problem with your medicine. · Do not take any vitamins, over-the-counter medicine, or herbal products without talking to your doctor first. Sophia Brown not take ibuprofen (Advil or Motrin) and naproxen (Aleve) without talking to your doctor first. They could make your heart failure worse. · You may be taking some of the following medicine. ¨ Beta-blockers can slow heart rate, decrease blood pressure, and improve your condition. Taking a beta-blocker may lower your chance of needing to be hospitalized. ¨ Angiotensin-converting enzyme inhibitors (ACEIs) reduce the heart's workload, lower blood pressure, and reduce swelling. Taking an ACEI may lower your chance of needing to be hospitalized again. ¨ Angiotensin II receptor blockers (ARBs) work like ACEIs. Your doctor may prescribe them instead of ACEIs. ¨ Diuretics, also called water pills, reduce swelling. ¨ Potassium supplements replace this important mineral, which is sometimes lost with diuretics. ¨ Aspirin and other blood thinners prevent blood clots, which can cause a stroke or heart attack. You will get more details on the specific medicines your doctor prescribes. Diet  · Your doctor may suggest that you limit sodium to 2,000 milligrams (mg) a day or less. That is less than 1 teaspoon of salt a day, including all the salt you eat in cooking or in packaged foods. People get most of their sodium from processed foods. Fast food and restaurant meals also tend to be very high in sodium. · Ask your doctor how much liquid you can drink each day. You may have to limit liquids. Weight  · Weigh yourself without clothing at the same time each day. Record your weight. Call your doctor if you gain more than 3 pounds in 2 to 3 days. A sudden weight gain may mean that your heart failure is getting worse. Activity level  · Start light exercise (if your doctor says it is okay). Even if you can only do a small amount, exercise will help you get stronger, have more energy, and manage your weight and your stress. Walking is an easy way to get exercise. Start out by walking a little more than you did before. Bit by bit, increase the amount you walk. · When you exercise, watch for signs that your heart is working too hard. You are pushing yourself too hard if you cannot talk while you are exercising. If you become short of breath or dizzy or have chest pain, stop, sit down, and rest.  · If you feel \"wiped out\" the day after you exercise, walk slower or for a shorter distance until you can work up to a better pace. · Get enough rest at night. Sleeping with 1 or 2 pillows under your upper body and head may help you breathe easier. Lifestyle changes  · Do not smoke. Smoking can make a heart condition worse. If you need help quitting, talk to your doctor about stop-smoking programs and medicines. These can increase your chances of quitting for good. Quitting smoking may be the most important step you can take to protect your heart. · Limit alcohol to 2 drinks a day for men and 1 drink a day for women. Too much alcohol can cause health problems. · Avoid getting sick from colds and the flu. Get a pneumococcal vaccine shot. If you have had one before, ask your doctor whether you need another dose. Get a flu shot each year. If you must be around people with colds or the flu, wash your hands often. When should you call for help? Call 911 if you have symptoms of sudden heart failure such as:  · You have severe trouble breathing. · You cough up pink, foamy mucus. · You have a new irregular or rapid heartbeat.   Call your doctor now or seek immediate medical care if:  · You have new or increased shortness of breath. · You are dizzy or lightheaded, or you feel like you may faint. · You have sudden weight gain, such as 3 pounds or more in 2 to 3 days. · You have increased swelling in your legs, ankles, or feet. · You are suddenly so tired or weak that you cannot do your usual activities. Watch closely for changes in your health, and be sure to contact your doctor if:  · You develop new symptoms. Where can you learn more? Go to http://edith-jose luis.info/. Enter O961 in the search box to learn more about \"Heart Failure: Care Instructions. \"  Current as of: January 27, 2016  Content Version: 11.1  © 7036-3689 [employer]. Care instructions adapted under license by [employer] (which disclaims liability or warranty for this information). If you have questions about a medical condition or this instruction, always ask your healthcare professional. [place] [address] any warranty or liability for your use of this information. Limiting Sodium and Fluids With Heart Failure: Care Instructions  Your Care Instructions  Sodium causes your body to keep extra water, making it harder for your heart to pump. By limiting sodium, you will feel better and lower your risk of having to go to the hospital.  People get most of their sodium from processed foods. Fast food and restaurant meals also tend to be very high in sodium. Your doctor may suggest that you limit sodium to 2,000 milligrams (mg) a day or less. That is less than 1 teaspoon of salt a day, including all the salt you eat in cooked or packaged foods. Usually, you have to limit the amount of liquids you drink only if your heart failure is severe. Limiting sodium alone often is enough to help your body get rid of extra fluids. However, your doctor may tell you to limit your fluid intake to a set amount each day.   Follow-up care is a key part of your treatment and safety. Be sure to make and go to all appointments, and call your doctor if you are having problems. It's also a good idea to know your test results and keep a list of the medicines you take. How can you care for yourself at home? Read food labels  · Read food labels on cans and food packages. The labels tell you how much sodium is in each serving. Make sure that you look at the serving size. If you eat more than the serving size, you have eaten more sodium than is listed for one serving. · Food labels also tell you the Percent Daily Value. If the Percent Daily Value says 50%, it means that you will get at least 50% of all the sodium you need for the entire day in one serving. Choose products with low Percent Daily Values for sodium. · Be aware that sodium can come in forms other than salt, including monosodium glutamate (MSG), sodium citrate, and sodium bicarbonate (baking soda). MSG is often added to Asian food. You can sometimes ask for food without MSG or salt. Buy low-sodium foods  · Buy foods that are labeled \"unsalted\" (no salt added), \"sodium-free\" (less than 5 mg of sodium per serving), or \"low-sodium\" (less than 140 mg of sodium per serving). A food labeled \"light sodium\" has less than half of the full-sodium version of that food. Foods labeled \"reduced-sodium\" may still have too much sodium. · Buy fresh vegetables or plain, frozen vegetables. Buy low-sodium versions of canned vegetables, soups, and other canned goods. Prepare low-sodium meals  · Use less salt each day when cooking. Reducing salt in this way will help you adjust to the taste. Do not add salt after cooking. Take the salt shaker off the table. · Flavor your food with garlic, lemon juice, onion, vinegar, herbs, and spices instead of salt. Do not use soy sauce, steak sauce, onion salt, garlic salt, mustard, or ketchup on your food. · Make your own salad dressings, sauces, and ketchup without adding salt.   · Use less salt (or none) when recipes call for it. You can often use half the salt a recipe calls for without losing flavor. Other dishes like rice, pasta, and grains do not need added salt. · Rinse canned vegetables. This removes some--but not all--of the salt. · Avoid water that has a naturally high sodium content or that has been treated with water softeners, which add sodium. Call your local water company to find out the sodium content of your water supply. If you buy bottled water, read the label and choose a sodium-free brand. Avoid high-sodium foods, such as:  · Smoked, cured, salted, and canned meat, fish, and poultry. · Ham, patel, hot dogs, and luncheon meats. · Regular, hard, and processed cheese and regular peanut butter. · Crackers with salted tops. · Frozen prepared meals. · Canned and dried soups, broths, and bouillon, unless labeled sodium-free or low-sodium. · Canned vegetables, unless labeled sodium-free or low-sodium. · Salted snack foods such as chips and pretzels. · Western Tiffanie fries, pizza, tacos, and other fast foods. · Pickles, olives, ketchup, and other condiments, especially soy sauce, unless labeled sodium-free or low-sodium. If you cannot cook for yourself  · Have family members or friends help you, or have someone cook low-sodium meals. · Check with your local senior nutrition program to find out where meals are served and whether they offer a low-sodium option. You can often find these programs through your local health department or hospital.  · Have meals delivered to your home. Most [facility] have a Meals on [employer]. These programs provide one hot meal a day for older adults, delivered to their homes. Ask whether these meals are low-sodium. Let them know that you are on a low-sodium diet. Limiting fluid intake  · Find a method that works for you. You might simply write down how much you drink every time you do. Some people keep a container filled with the amount of fluid allowed for that day. If they drink from a source other than the container, then they pour out that amount. · Measure your regular drinking glasses to find out how much fluid each one holds. Once you know this, you will not have to measure every time. · Besides water, milk, juices, and other drinks, some foods have a lot of fluid. Count any foods that will melt (such as ice cream or gelatin dessert) or liquid foods (such as soup) as part of your fluid intake for the day. Where can you learn more? Go to http://edith-jose luis.info/. Enter A166 in the search box to learn more about \"Limiting Sodium and Fluids With Heart Failure: Care Instructions. \"  Current as of: January 27, 2016  Content Version: 11.1  © 9576-7236 [employer]. Care instructions adapted under license by [employer] (which disclaims liability or warranty for this information). If you have questions about a medical condition or this instruction, always ask your healthcare professional. [place] [address] any warranty or liability for your use of this information. Avoiding Triggers With Heart Failure: Care Instructions  Your Care Instructions  Triggers are anything that make your heart failure flare up. A flare-up is also called \"sudden heart failure\" or \"acute heart failure. \" When you have a flare-up, fluid builds up in your lungs, and you have problems breathing. You might need to go to the hospital. By watching for changes in your condition and avoiding triggers, you can prevent heart failure flare-ups. Follow-up care is a key part of your treatment and safety. Be sure to make and go to all appointments, and call your doctor if you are having problems. It's also a good idea to know your test results and keep a list of the medicines you take. How can you care for yourself at home? Watch for changes in your weight and condition  · Weigh yourself without clothing at the same time each day.  Record your weight. Call your doctor if you gain 3 pounds or more in 2 to 3 days. A sudden weight gain may mean that your heart failure is getting worse. · Keep a daily record of your symptoms. Write down any changes in how you feel, such as new shortness of breath, cough, or problems eating. Also record if your ankles are more swollen than usual and if you have to urinate in the night more often. Note anything that you ate or did that could have triggered these changes. Limit sodium  Sodium causes your body to hold on to water, making it harder for your heart to pump. People get most of their sodium from processed foods. Fast food and restaurant meals also tend to be very high in sodium. · Your doctor may suggest that you limit sodium to 2,000 milligrams (mg) a day or less. That is less than 1 teaspoon of salt a day, including all the salt you eat in cooking or in packaged foods. · Read food labels on cans and food packages. They tell you how much sodium you get in one serving. Check the serving size. If you eat more than one serving, you are getting more sodium. · Be aware that sodium can come in forms other than salt, including monosodium glutamate (MSG), sodium citrate, and sodium bicarbonate (baking soda). MSG is often added to Asian food. You can sometimes ask for food without MSG or salt. · Slowly reducing salt will help you adjust to the taste. Take the salt shaker off the table. · Flavor your food with garlic, lemon juice, onion, vinegar, herbs, and spices instead of salt. Do not use soy sauce, steak sauce, onion salt, garlic salt, mustard, or ketchup on your food, unless it is labeled \"low-sodium\" or \"low-salt. \"  · Make your own salad dressings, sauces, and ketchup without adding salt. · Use fresh or frozen ingredients, instead of canned ones, whenever you can. Choose low-sodium canned goods. · Eat less processed food and food from restaurants, including fast food.   Exercise as directed  Moderate, regular exercise is very good for your heart. It improves your blood flow and helps control your weight. But too much exercise can stress your heart and cause a heart failure flare-up. · Check with your doctor before you start an exercise program.  · Walking is an easy way to get exercise. Start out slowly. Gradually increase the length and pace of your walk. Swimming, riding a bike, and using a treadmill are also good forms of exercise. · When you exercise, watch for signs that your heart is working too hard. You are pushing yourself too hard if you cannot talk while you are exercising. If you become short of breath or dizzy or have chest pain, stop, sit down, and rest.  · Do not exercise when you do not feel well. Take medicines correctly  · Take your medicines exactly as prescribed. Call your doctor if you think you are having a problem with your medicine. · Make a list of all the medicines you take. Include those prescribed to you by other doctors and any over-the-counter medicines, vitamins, or supplements you take. Take this list with you when you go to any doctor. · Take your medicines at the same time every day. It may help you to post a list of all the medicines you take every day and what time of day you take them. · Make taking your medicine as simple as you can. Plan times to take your medicines when you are doing other things, such as eating a meal or getting ready for bed. This will make it easier to remember to take your medicines. · Get organized. Use helpful tools, such as daily or weekly pill containers. When should you call for help? Call 911 if you have symptoms of sudden heart failure such as:  · You have severe trouble breathing. · You cough up pink, foamy mucus. · You have a new irregular or rapid heartbeat. Call your doctor now or seek immediate medical care if:  · You have new or increased shortness of breath. · You are dizzy or lightheaded, or you feel like you may faint.   · You have sudden weight gain, such as 3 pounds or more in 2 to 3 days. · You have increased swelling in your legs, ankles, or feet. · You are suddenly so tired or weak that you cannot do your usual activities. Watch closely for changes in your health, and be sure to contact your doctor if you develop new symptoms. Where can you learn more? Go to http://edith-jose luis.info/. Enter J188 in the search box to learn more about \"Avoiding Triggers With Heart Failure: Care Instructions. \"  Current as of: April 27, 2016  Content Version: 11.1  © 0331-4310 [employer]. Care instructions adapted under license by [employer] (which disclaims liability or warranty for this information). If you have questions about a medical condition or this instruction, always ask your healthcare professional. [address] any warranty or liability for your use of this information. DISCHARGE SUMMARY from Nurse    The following personal items are in your possession at time of discharge:    Dental Appliances: Lowers, Partials, With patient (Plate)  Visual Aid: Glasses     Jewelry: Ring, With patient  Clothing: Pants, Shirt, Undergarments, With patient    PATIENT INSTRUCTIONS:    After general anesthesia or intravenous sedation, for 24 hours or while taking prescription Narcotics:  · Limit your activities  · Do not drive and operate hazardous machinery  · Do not make important personal or business decisions  · Do  not drink alcoholic beverages  · If you have not urinated within 8 hours after discharge, please contact your surgeon on call.     Report the following to your surgeon:  · Excessive pain, swelling, redness or odor of or around the surgical area  · Temperature over 100.5  · Nausea and vomiting lasting longer than 4 hours or if unable to take medications  · Any signs of decreased circulation or nerve impairment to extremity: change in color, persistent  numbness, tingling, coldness or increase pain  · Any questions    What to do at Home:  Recommended activity: No lifting or Strenuous exercise for 5 days    If you experience any of the following symptoms of unrelieved chest pain or increased shortness of breath, please follow up with [facility] at [phone]. *  Please give a list of your current medications to your Primary Care Provider. *  Please update this list whenever your medications are discontinued, doses are      changed, or new medications (including over-the-counter products) are added. *  Please carry medication information at all times in case of emergency situations. These are general instructions for a healthy lifestyle:    No smoking/ No tobacco products/ Avoid exposure to second hand smoke    Surgeon General's Warning:  Quitting smoking now greatly reduces serious risk to your health. Obesity, smoking, and sedentary lifestyle greatly increases your risk for illness    A healthy diet, regular physical exercise & weight monitoring are important for maintaining a healthy lifestyle    You may be retaining fluid if you have a history of heart failure or if you experience any of the following symptoms:  Weight gain of 3 pounds or more overnight or 5 pounds in a week, increased swelling in our hands or feet or shortness of breath while lying flat in bed. Please call your doctor as soon as you notice any of these symptoms; do not wait until your next office visit. Recognize signs and symptoms of STROKE:    F-face looks uneven    A-arms unable to move or move unevenly    S-speech slurred or non-existent    T-time-call 911 as soon as signs and symptoms begin-DO NOT go       Back to bed or wait to see if you get better-TIME IS BRAIN. Warning Signs of HEART ATTACK     Call 911 if you have these symptoms:   Chest discomfort.  Most heart attacks involve discomfort in the center of the chest that lasts more than a few minutes, or that goes away and comes back. It can feel like uncomfortable pressure, squeezing, fullness, or pain.  Discomfort in other areas of the upper body. Symptoms can include pain or discomfort in one or both arms, the back, neck, jaw, or stomach.  Shortness of breath with or without chest discomfort.  Other signs may include breaking out in a cold sweat, nausea, or lightheadedness. Don't wait more than five minutes to call 911 - MINUTES MATTER! Fast action can save your life. Calling 911 is almost always the fastest way to get lifesaving treatment. Emergency Medical Services staff can begin treatment when they arrive -- up to an hour sooner than if someone gets to the hospital by car. The discharge information has been reviewed with the patient. The patient verbalized understanding. Discharge medications reviewed with the patient and appropriate educational materials and side effects teaching were provided.

## 2017-01-20 NOTE — ROUTINE PROCESS
CHF teaching started post introduction to pt/family; aware of diagnosis. Planner/scale @ BS and will follow. Smoking/ ETOH/Illicit drug use cessation covered. Pt/family aware that I can not prescribe nor adjust  medications: 15mins  Palliative Care score:  Start 2 L/D FR  CHF teaching continues to pt/family. Emphasis on taking prescription meds as ordered, to keep F/U appts and to call MD STAT. CHF teaching completed, verbalize emphasis on monitoring self and report to MD:   If you gain 2 lbs in one day or 5 lbs in a week, and short of breath.  If you can not lay flat without developing short of breath or rapid breathing at night; or if it wakes you up. Develop a cough or wheezing.  If you notice swollen hands/feet/ankles or stomach with a bloated/ full feeling.  If you become confused or mentally fuzzy or dizzy.  If you notice a rapid or change in your heart rate.  If you become more exhausted all the time and unable to do the same level of activity without stopping to catch your breath. Drink no more than 8 cups a day in 8 oz. cups. Limit Cola Drinks. Your Heart can not handle any more. Stay away from salt (limit anything with salt or sodium in it). Limit to 250mg per serving. Exercise needs to be started with your Doctors approval.  Reduce stress  Pass post test via teach back, will make self available post DC ,if an questions arise. Diabetic teaching completed.  Planner/scale @ BS:  60 mins total

## 2017-01-25 ENCOUNTER — HOSPITAL ENCOUNTER (OUTPATIENT)
Dept: LAB | Age: 47
Discharge: HOME | End: 2017-01-25
Attending: INTERNAL MEDICINE
Payer: COMMERCIAL

## 2017-01-25 PROBLEM — I50.9 CHF (CONGESTIVE HEART FAILURE) (HCC): Status: ACTIVE | Noted: 2017-01-25

## 2017-01-25 PROBLEM — N18.30 CHRONIC KIDNEY DISEASE, STAGE III (MODERATE) (HCC): Status: ACTIVE | Noted: 2017-01-25

## 2017-01-25 LAB
APPEARANCE UR: CLEAR
BACTERIA URNS QL MICRO: 0 /HPF
BILIRUB UR QL: NEGATIVE
CASTS URNS QL MICRO: 0 /LPF
COLOR UR: YELLOW
CRYSTALS URNS QL MICRO: 0 /LPF
EPI CELLS #/AREA URNS HPF: NORMAL /HPF
GLUCOSE UR STRIP.AUTO-MCNC: NEGATIVE MG/DL
HGB UR QL STRIP: ABNORMAL
KETONES UR QL STRIP.AUTO: NEGATIVE MG/DL
LEUKOCYTE ESTERASE UR QL STRIP.AUTO: NEGATIVE
MUCOUS THREADS URNS QL MICRO: 0 /LPF
NITRITE UR QL STRIP.AUTO: NEGATIVE
PH UR STRIP: 5 [PH] (ref 5–9)
PROT UR STRIP-MCNC: NEGATIVE MG/DL
RBC #/AREA URNS HPF: NORMAL /HPF
SP GR UR REFRACTOMETRY: 1.01 (ref 1–1.02)
UROBILINOGEN UR QL STRIP.AUTO: 0.2 EU/DL (ref 0.2–1)
WBC URNS QL MICRO: 0 /HPF

## 2017-01-25 PROCEDURE — 81003 URINALYSIS AUTO W/O SCOPE: CPT | Performed by: INTERNAL MEDICINE

## 2017-01-25 PROCEDURE — 81015 MICROSCOPIC EXAM OF URINE: CPT | Performed by: INTERNAL MEDICINE

## 2022-03-18 PROBLEM — I50.23 ACUTE ON CHRONIC SYSTOLIC (CONGESTIVE) HEART FAILURE (HCC): Status: ACTIVE | Noted: 2017-01-17

## 2022-03-18 PROBLEM — R29.818 SUSPECTED SLEEP APNEA: Status: ACTIVE | Noted: 2017-01-17

## 2022-03-19 PROBLEM — J90 PLEURAL EFFUSION: Status: ACTIVE | Noted: 2017-01-17

## 2022-03-19 PROBLEM — N18.30 CHRONIC KIDNEY DISEASE, STAGE III (MODERATE) (HCC): Status: ACTIVE | Noted: 2017-01-25

## 2022-03-19 PROBLEM — R06.00 DYSPNEA: Status: ACTIVE | Noted: 2017-01-17

## 2022-03-19 PROBLEM — I50.9 CHF (CONGESTIVE HEART FAILURE) (HCC): Status: ACTIVE | Noted: 2017-01-25

## 2022-03-20 PROBLEM — I16.0 HYPERTENSIVE URGENCY: Status: ACTIVE | Noted: 2017-01-17

## 2023-12-31 RX ORDER — LISINOPRIL 10 MG/1
TABLET ORAL
Qty: 90 TABLET | OUTPATIENT
Start: 2023-12-31

## 2023-12-31 RX ORDER — CARVEDILOL 12.5 MG/1
TABLET ORAL
Qty: 180 TABLET | OUTPATIENT
Start: 2023-12-31